# Patient Record
Sex: FEMALE | Race: WHITE | NOT HISPANIC OR LATINO
[De-identification: names, ages, dates, MRNs, and addresses within clinical notes are randomized per-mention and may not be internally consistent; named-entity substitution may affect disease eponyms.]

---

## 2018-07-31 PROBLEM — Z00.00 ENCOUNTER FOR PREVENTIVE HEALTH EXAMINATION: Status: ACTIVE | Noted: 2018-07-31

## 2018-10-09 ENCOUNTER — RX RENEWAL (OUTPATIENT)
Age: 68
End: 2018-10-09

## 2018-11-08 ENCOUNTER — RECORD ABSTRACTING (OUTPATIENT)
Age: 68
End: 2018-11-08

## 2018-11-08 DIAGNOSIS — Z83.49 FAMILY HISTORY OF OTHER ENDOCRINE, NUTRITIONAL AND METABOLIC DISEASES: ICD-10-CM

## 2018-11-08 DIAGNOSIS — Z87.19 PERSONAL HISTORY OF OTHER DISEASES OF THE DIGESTIVE SYSTEM: ICD-10-CM

## 2018-11-08 DIAGNOSIS — Z83.3 FAMILY HISTORY OF DIABETES MELLITUS: ICD-10-CM

## 2018-11-08 DIAGNOSIS — Z82.49 FAMILY HISTORY OF ISCHEMIC HEART DISEASE AND OTHER DISEASES OF THE CIRCULATORY SYSTEM: ICD-10-CM

## 2018-11-08 DIAGNOSIS — Z78.9 OTHER SPECIFIED HEALTH STATUS: ICD-10-CM

## 2018-11-08 DIAGNOSIS — Z80.9 FAMILY HISTORY OF MALIGNANT NEOPLASM, UNSPECIFIED: ICD-10-CM

## 2018-11-08 DIAGNOSIS — Z86.39 PERSONAL HISTORY OF OTHER ENDOCRINE, NUTRITIONAL AND METABOLIC DISEASE: ICD-10-CM

## 2018-11-08 DIAGNOSIS — Z87.39 PERSONAL HISTORY OF OTHER DISEASES OF THE MUSCULOSKELETAL SYSTEM AND CONNECTIVE TISSUE: ICD-10-CM

## 2018-11-09 ENCOUNTER — APPOINTMENT (OUTPATIENT)
Dept: ENDOCRINOLOGY | Facility: CLINIC | Age: 68
End: 2018-11-09
Payer: COMMERCIAL

## 2018-11-09 VITALS
WEIGHT: 192 LBS | BODY MASS INDEX: 34.02 KG/M2 | DIASTOLIC BLOOD PRESSURE: 60 MMHG | HEART RATE: 80 BPM | HEIGHT: 63 IN | SYSTOLIC BLOOD PRESSURE: 110 MMHG

## 2018-11-09 LAB — GLUCOSE BLDC GLUCOMTR-MCNC: 113

## 2018-11-09 PROCEDURE — 99214 OFFICE O/P EST MOD 30 MIN: CPT | Mod: 25

## 2018-11-09 PROCEDURE — 82962 GLUCOSE BLOOD TEST: CPT

## 2018-11-18 ENCOUNTER — RX RENEWAL (OUTPATIENT)
Age: 68
End: 2018-11-18

## 2019-02-25 ENCOUNTER — OTHER (OUTPATIENT)
Age: 69
End: 2019-02-25

## 2019-03-07 ENCOUNTER — APPOINTMENT (OUTPATIENT)
Dept: ENDOCRINOLOGY | Facility: CLINIC | Age: 69
End: 2019-03-07
Payer: COMMERCIAL

## 2019-03-07 VITALS
WEIGHT: 192 LBS | HEIGHT: 63 IN | SYSTOLIC BLOOD PRESSURE: 124 MMHG | BODY MASS INDEX: 34.02 KG/M2 | HEART RATE: 71 BPM | DIASTOLIC BLOOD PRESSURE: 58 MMHG

## 2019-03-07 LAB — GLUCOSE BLDC GLUCOMTR-MCNC: 132

## 2019-03-07 PROCEDURE — 99214 OFFICE O/P EST MOD 30 MIN: CPT | Mod: 25

## 2019-03-07 PROCEDURE — 82962 GLUCOSE BLOOD TEST: CPT

## 2019-03-07 RX ORDER — OMEPRAZOLE 40 MG/1
40 CAPSULE, DELAYED RELEASE ORAL
Refills: 0 | Status: ACTIVE | COMMUNITY

## 2019-03-07 RX ORDER — FENOFIBRIC ACID 135 MG/1
135 CAPSULE, DELAYED RELEASE ORAL
Refills: 0 | Status: ACTIVE | COMMUNITY

## 2019-03-07 RX ORDER — EZETIMIBE 10 MG/1
10 TABLET ORAL
Refills: 0 | Status: ACTIVE | COMMUNITY

## 2019-03-07 RX ORDER — BLOOD SUGAR DIAGNOSTIC
STRIP MISCELLANEOUS
Qty: 1 | Refills: 3 | Status: ACTIVE | COMMUNITY
Start: 2019-03-07 | End: 1900-01-01

## 2019-03-07 NOTE — ASSESSMENT
[FreeTextEntry1] : DM type 2, good control without hypoglycemia\par hypothyroid, euthyroid on replacement\par hyperlipidemia stable

## 2019-03-07 NOTE — HISTORY OF PRESENT ILLNESS
[FreeTextEntry1] : Quality:  Type 2.   \par Severity:  Moderate\par Duration:  15 years\par Onset:  Presented with blurry vision\par Associated Symptoms:   none\par Notes:  Current regimen:\par 	metformin 500 two bid\par 	Novolog	flexible dosing, up to 50 units a day. 10-15 units recently\par 	tresiba	84\par 	victoza	1.8 - discontinuing victoza as not affordable\par carries glucose tabs\par \par Weight History:  \par weight watchers\par \par Blood Glucose Testing Information \par using dorita\par \par \par Past Medical History\par Arthritis. Hypertension. Thyroid Problems. Diabetes. Reflux/Ulcers. Cholesterol. \par Notes:  hypothyroid, on T4 100 and T3 12.5 ug\par renal cysts\par

## 2019-03-07 NOTE — PHYSICAL EXAM
[Thyroid Not Enlarged] : the thyroid was not enlarged [Clear to Auscultation] : lungs were clear to auscultation bilaterally

## 2019-04-24 ENCOUNTER — RX RENEWAL (OUTPATIENT)
Age: 69
End: 2019-04-24

## 2019-05-04 ENCOUNTER — RX RENEWAL (OUTPATIENT)
Age: 69
End: 2019-05-04

## 2019-07-29 LAB
HBA1C MFR BLD HPLC: 6.4
LDLC SERPL CALC-MCNC: 64

## 2019-08-01 ENCOUNTER — APPOINTMENT (OUTPATIENT)
Dept: ENDOCRINOLOGY | Facility: CLINIC | Age: 69
End: 2019-08-01
Payer: COMMERCIAL

## 2019-08-01 ENCOUNTER — MEDICATION RENEWAL (OUTPATIENT)
Age: 69
End: 2019-08-01

## 2019-08-01 VITALS
BODY MASS INDEX: 33.13 KG/M2 | HEART RATE: 74 BPM | WEIGHT: 187 LBS | SYSTOLIC BLOOD PRESSURE: 120 MMHG | DIASTOLIC BLOOD PRESSURE: 70 MMHG | HEIGHT: 63 IN

## 2019-08-01 LAB — GLUCOSE BLDC GLUCOMTR-MCNC: 158

## 2019-08-01 PROCEDURE — 82962 GLUCOSE BLOOD TEST: CPT

## 2019-08-01 PROCEDURE — 99214 OFFICE O/P EST MOD 30 MIN: CPT | Mod: 25

## 2019-08-01 RX ORDER — IRBESARTAN AND HYDROCHLOROTHIAZIDE 150; 12.5 MG/1; MG/1
150-12.5 TABLET, FILM COATED ORAL
Refills: 0 | Status: DISCONTINUED | COMMUNITY
End: 2019-08-01

## 2019-08-01 NOTE — ASSESSMENT
[FreeTextEntry1] : DM type 2, good control \par hypothyroid, euthyroid on replacement\par hyperlipidemia stable

## 2019-08-01 NOTE — HISTORY OF PRESENT ILLNESS
[FreeTextEntry1] : Quality:  Type 2.   \par Severity:  Moderate\par Duration:  15 years\par Onset:  Presented with blurry vision\par Associated Symptoms:   none\par Notes:  Current regimen:\par 	metformin 500 two bid\par 	Novolog	flexible dosing, up to 50 units a day. 10-15 units recently. Injecting four times a day\par 	tresiba	60\par 	victoza	1.8 - discontinuing victoza as not affordable\par carries glucose tabs\par \par Weight History:  \par weight watchers\par \par Blood Glucose Testing Information \par using dorita. ? no longer covered by insurance. According to pt. dexcom covered\par \par \par Past Medical History\par Arthritis. Hypertension. Thyroid Problems. Diabetes. Reflux/Ulcers. Cholesterol. \par Notes:  hypothyroid, on T4 100 and T3 12.5 ug\par renal cysts\par

## 2019-08-28 ENCOUNTER — RX RENEWAL (OUTPATIENT)
Age: 69
End: 2019-08-28

## 2019-11-22 ENCOUNTER — RX RENEWAL (OUTPATIENT)
Age: 69
End: 2019-11-22

## 2020-01-09 ENCOUNTER — APPOINTMENT (OUTPATIENT)
Dept: ENDOCRINOLOGY | Facility: CLINIC | Age: 70
End: 2020-01-09
Payer: COMMERCIAL

## 2020-01-09 VITALS
HEIGHT: 63 IN | OXYGEN SATURATION: 98 % | SYSTOLIC BLOOD PRESSURE: 130 MMHG | WEIGHT: 185.31 LBS | DIASTOLIC BLOOD PRESSURE: 80 MMHG | BODY MASS INDEX: 32.84 KG/M2 | HEART RATE: 68 BPM

## 2020-01-09 LAB
GLUCOSE BLDC GLUCOMTR-MCNC: 94
LDLC SERPL DIRECT ASSAY-MCNC: 91
MICROALBUMIN/CREAT 24H UR-RTO: 11

## 2020-01-09 PROCEDURE — 99214 OFFICE O/P EST MOD 30 MIN: CPT | Mod: 25

## 2020-01-09 PROCEDURE — 82962 GLUCOSE BLOOD TEST: CPT

## 2020-01-09 RX ORDER — VALSARTAN AND HYDROCHLOROTHIAZIDE 160; 25 MG/1; MG/1
160-25 TABLET, FILM COATED ORAL
Refills: 0 | Status: ACTIVE | COMMUNITY

## 2020-01-09 RX ORDER — FENOFIBRATE 134 MG/1
134 CAPSULE ORAL
Refills: 0 | Status: DISCONTINUED | COMMUNITY
End: 2020-01-09

## 2020-01-09 RX ORDER — CYPROHEPTADINE HYDROCHLORIDE 4 MG/1
4 TABLET ORAL
Refills: 0 | Status: DISCONTINUED | COMMUNITY
End: 2020-01-09

## 2020-01-09 NOTE — HISTORY OF PRESENT ILLNESS
[FreeTextEntry1] : Quality:  Type 2.   \par Severity:  Moderate\par Duration:  16 years\par Onset:  Presented with blurry vision\par Associated Symptoms:   none\par Notes:  Current regimen:\par 	metformin 500 two bid\par 	Novolog	flexible dosing, up to 50 units a day. 10-15 units recently. Injecting four times a day\par 	tresiba	40-50\par 	victoza	1.8 - discontinuing victoza as not affordable\par carries glucose tabs\par \par Weight History:  \par weight watchers\par \par Blood Glucose Testing Information \par using dorita. ? no longer covered by insurance. According to pt. dexcom covered\par \par \par Past Medical History\par Arthritis. Hypertension. Thyroid Problems. Diabetes. Reflux/Ulcers. Cholesterol. \par Notes:  hypothyroid, on T4 100 and T3 12.5 ug\par renal cysts\par

## 2020-01-09 NOTE — ASSESSMENT
[FreeTextEntry1] : DM type 2, good control . TO try to lock in to a stable tresiba dose\par hypothyroid, euthyroid on replacement\par hyperlipidemia with persistent triglyceride elevation; continue to observe.

## 2020-05-01 ENCOUNTER — APPOINTMENT (OUTPATIENT)
Dept: ENDOCRINOLOGY | Facility: CLINIC | Age: 70
End: 2020-05-01
Payer: COMMERCIAL

## 2020-05-01 PROCEDURE — 99214 OFFICE O/P EST MOD 30 MIN: CPT | Mod: 95

## 2020-05-01 NOTE — PHYSICAL EXAM
[Alert] : alert [No Acute Distress] : no acute distress [Oriented x3] : oriented to person, place, and time [Normal Insight/Judgement] : insight and judgment were intact [de-identified] : no periorbital edema [de-identified] : voice WNL

## 2020-05-01 NOTE — ASSESSMENT
[FreeTextEntry1] : DM type 2, good control . Try to review dorita downloads\par hypothyroid, euthyroid on replacement\par hyperlipidemia with persistent triglyceride elevation; improving

## 2020-05-01 NOTE — HISTORY OF PRESENT ILLNESS
[Medical Office: ([facility])___] : at the medical office located in  [Home] : at home, [unfilled] , at the time of the visit. [Patient] : the patient [FreeTextEntry1] : Quality:  Type 2.   \par Severity:  Moderate\par Duration:  16 years\par Onset:  Presented with blurry vision\par Associated Symptoms:   none\par Notes:  Current regimen:\par 	metformin 500 two bid\par 	Novolog	flexible dosing, up to 50 units a day. 10-15 units recently. Injecting four times a day\par 	tresiba	44\par 	victoza	1.8 - discontinuing victoza as not affordable\par carries glucose tabs\par \par Weight History:  \par weight watchers\par \par Blood Glucose Testing Information \par using dorita\par \par Past Medical History\par Arthritis. Hypertension. Thyroid Problems. Diabetes. Reflux/Ulcers. Cholesterol. \par Notes:  hypothyroid, on T4 100 and T3 12.5 ug\par renal cysts\par

## 2020-09-28 ENCOUNTER — APPOINTMENT (OUTPATIENT)
Dept: ENDOCRINOLOGY | Facility: CLINIC | Age: 70
End: 2020-09-28

## 2020-09-29 ENCOUNTER — APPOINTMENT (OUTPATIENT)
Dept: ENDOCRINOLOGY | Facility: CLINIC | Age: 70
End: 2020-09-29
Payer: COMMERCIAL

## 2020-09-29 ENCOUNTER — RESULT CHARGE (OUTPATIENT)
Age: 70
End: 2020-09-29

## 2020-09-29 VITALS
HEART RATE: 62 BPM | DIASTOLIC BLOOD PRESSURE: 60 MMHG | HEIGHT: 63 IN | BODY MASS INDEX: 30.48 KG/M2 | SYSTOLIC BLOOD PRESSURE: 122 MMHG | WEIGHT: 172 LBS

## 2020-09-29 LAB
GLUCOSE BLDC GLUCOMTR-MCNC: 146
HBA1C MFR BLD HPLC: 6.5
HBA1C MFR BLD HPLC: 6.6
LDLC SERPL CALC-MCNC: 65
MICROALBUMIN/CREAT 24H UR-RTO: NORMAL

## 2020-09-29 PROCEDURE — 95251 CONT GLUC MNTR ANALYSIS I&R: CPT

## 2020-09-29 PROCEDURE — 99214 OFFICE O/P EST MOD 30 MIN: CPT | Mod: 25

## 2020-09-29 RX ORDER — TOPIRAMATE 100 MG/1
100 TABLET, FILM COATED ORAL
Refills: 0 | Status: DISCONTINUED | COMMUNITY
End: 2020-09-29

## 2020-09-29 NOTE — ASSESSMENT
[FreeTextEntry1] : 69 year old female with T2DM, hypothyroidism, and hyperlipidemia. Glycemic control is excellent.\par \par 1. T2DM- excellent control with A1c 6.5%.\par -Decrease Novolog at dinner by two units.\par -Continue Tresiba and Metformin.\par -Continue SMBG with freestyle Christian.\par -Glucose sensor necessity: This patient with diabetes performs four or more glucose checks per day utilizing a home blood glucose monitor. The patient is treated with insulin via three or more injections daily (or a subcutaneous insulin infusion pump). This patient requires frequent adjustments to their insulin treatment on the basis of therapeutic continuous glucose monitoring results and according to sliding scale. In addition, the patient has been to our office for an evaluation of their diabetes control within the past six months. \par -Repeat A1c in 3 months.\par \par 2. Hypothyroid- euthyroid on replacement T4 and T3.\par -Continue levothyroxine and liothyronine.\par -Repeat TFTs with next labs in 3 months.\par \par 3. Hyperlipidemia- LDL at target,, triglycerides improving.\par -Continue statin, Zetia, and Trilipix.

## 2020-09-29 NOTE — PHYSICAL EXAM
[Alert] : alert [Well Nourished] : well nourished [No Acute Distress] : no acute distress [Well Developed] : well developed [Normal Sclera/Conjunctiva] : normal sclera/conjunctiva [EOMI] : extra ocular movement intact [No Proptosis] : no proptosis [Thyroid Not Enlarged] : the thyroid was not enlarged [No Thyroid Nodules] : no palpable thyroid nodules [No Respiratory Distress] : no respiratory distress [No Accessory Muscle Use] : no accessory muscle use [Clear to Auscultation] : lungs were clear to auscultation bilaterally [Normal S1, S2] : normal S1 and S2 [Normal Rate] : heart rate was normal [Regular Rhythm] : with a regular rhythm [No Edema] : no peripheral edema [Normal Anterior Cervical Nodes] : no anterior cervical lymphadenopathy [Normal Posterior Cervical Nodes] : no posterior cervical lymphadenopathy [No Tremors] : no tremors [Oriented x3] : oriented to person, place, and time [Normal Affect] : the affect was normal [Normal Mood] : the mood was normal [Acanthosis Nigricans] : no acanthosis nigricans

## 2020-09-29 NOTE — REVIEW OF SYSTEMS
[Recent Weight Loss (___ Lbs)] : recent weight loss: [unfilled] lbs [Chest Pain] : chest pain [Blurred Vision] : no blurred vision [Palpitations] : no palpitations [Shortness Of Breath] : no shortness of breath [Nausea] : no nausea [Abdominal Pain] : no abdominal pain [Vomiting] : no vomiting [Polyuria] : no polyuria [Tremors] : no tremors [Depression] : no depression [Anxiety] : no anxiety [Polydipsia] : no polydipsia [Cold Intolerance] : no cold intolerance [Heat Intolerance] : no heat intolerance [FreeTextEntry5] : sees cardiology Yes

## 2020-09-29 NOTE — HISTORY OF PRESENT ILLNESS
[FreeTextEntry1] : Quality: Type 2. \par Severity: Moderate\par Duration: 16 years\par Onset: Presented with blurry vision\par Associated Symptoms: none\par Notes: Current regimen:\par 	Metformin 500 mg, two tabs BID\par 	Novolog	flexible dosing, up to 50 units a day, usually around 12 units\par 	Tresiba	40 units\par On Victoza in the past, d/c'ed due to insurance \par carries glucose tabs\par \par Weight:lost 10 pounds over the past 9 months\par Exercise: biking more\par Diet: no changes, two waffles and 4 sausages for breakfast, big salad for lunch, small dinner (small porition of meat with vegetable, occasionally has rice/potato) \par \par Eye exam: 2020, no DR per patient\par Foot exam: q 10 weeks, denies pain/numbness/tingling in B/L feet\par Kidney disease: sees nephro for kidney cysts\par Heart disease: none\par \par Blood Glucose Testing Information \par using dorita\par occasional hypoglycemia- usually when not eating a lot of carbs, mostly at night.\par Current B. ate breakfast two hours\par Reviewed CGMS. Avg , %CV 27% with 94% in range, 4% high, and 2% low. BG trends low after dinner.\par \par Past Medical History\par Arthritis. Hypertension. Thyroid Problems. Diabetes. Reflux/Ulcers. Cholesterol. \par Notes: hypothyroid, on T4 100 and T3 12.5 ug\par renal cysts\par \par Hypothyroid\par LT4 100 mcg dialy- takes with all other medications 1 hour before breakfast\par Cytomel 12.5 mg daily- takes before lunch every day.\par

## 2020-12-28 LAB
HBA1C MFR BLD HPLC: 6.9
LDLC SERPL DIRECT ASSAY-MCNC: 76
MICROALBUMIN/CREAT 24H UR-RTO: 3

## 2020-12-29 ENCOUNTER — APPOINTMENT (OUTPATIENT)
Dept: ENDOCRINOLOGY | Facility: CLINIC | Age: 70
End: 2020-12-29
Payer: COMMERCIAL

## 2020-12-29 PROCEDURE — 99214 OFFICE O/P EST MOD 30 MIN: CPT | Mod: 25,95

## 2020-12-29 RX ORDER — ASPIRIN 81 MG
81 TABLET, DELAYED RELEASE (ENTERIC COATED) ORAL
Refills: 0 | Status: ACTIVE | COMMUNITY

## 2020-12-29 NOTE — HISTORY OF PRESENT ILLNESS
[FreeTextEntry1] : This visit was provided via telehealth using real-time 2-way audio visual technology. The patient, DAVID LAMBERT , was located at home, [address] [place] [address] , at the time of the visit. \par The provider, LARA VOGT, was located at the medical office located in [place], [place] at the time of the visit. The patient, DAVID LAMBERT and Provider participated in the telehealth encounter. \par Verbal consent given on 12/29/2020 by the patient. \par \par Telehealth visit performed due to COVID-19 Pandemic.\par Time started: 9:28 AM\par Time Ended: 9:39 AM\par \par Today with c/o leg pain since November, initially improved with CoQ 10 but now worsening. Started Zetia at the end of August and attributes leg pain to new medication. Discussed with cardiology and has follow-up with PCP about symptoms today.\par \par Quality: Type 2. \par Severity: Moderate\par Duration: 16 years\par Onset: Presented with blurry vision\par Associated Symptoms: none\par Notes: Current regimen:\par 	Metformin 500 mg, two tabs BID\par 	Novolog	flexible dosing, up to 50 units a day, usually around 12 units\par 	Tresiba	40 units\par On Victoza in the past, d/c'ed due to insurance \par carries glucose tabs\par \par Weight:lost 4 pounds\par Exercise: biking 5 mins every hour daily\par Diet: no changes, two waffles and 4 sausages for breakfast, big salad for lunch, small dinner (small porition of meat with vegetable, occasionally has rice/potato) \par \par Eye exam: January 2020, no DR per patient\par Foot exam: q 10 weeks, denies pain/numbness/tingling in B/L feet\par Kidney disease: sees nephro for kidney cysts\par Heart disease: none\par \par Blood Glucose Testing Information \par using dorita\par occasional hypoglycemia- usually when not eating a lot of carbs, mostly at night.\par Reviewed CGMS. Avg , %CV 26.4% with 89% in range, 9% high, and 2% low. BG trends low in the afternoon and evening, prior to dinner.\par \par Past Medical History\par Arthritis. Hypertension. Thyroid Problems. Diabetes. Reflux/Ulcers. Cholesterol. \par renal cysts\par \par Hypothyroid\par LT4 100 mcg daily- takes with all other medications 1 hour before breakfast\par Cytomel 12.5 mg daily- takes before lunch every day.\par

## 2020-12-29 NOTE — REVIEW OF SYSTEMS
[Recent Weight Loss (___ Lbs)] : recent weight loss: [unfilled] lbs [Myalgia] : myalgia  [Muscle Cramps] : muscle cramps [Recent Weight Gain (___ Lbs)] : no recent weight gain [Dysphagia] : no dysphagia [Neck Pain] : no neck pain [Dysphonia] : no dysphonia [Chest Pain] : no chest pain [Palpitations] : no palpitations [Shortness Of Breath] : no shortness of breath [Nausea] : no nausea [Constipation] : no constipation [Abdominal Pain] : no abdominal pain [Vomiting] : no vomiting [Diarrhea] : no diarrhea [Polyuria] : no polyuria [Tremors] : no tremors [Pain/Numbness of Digits] : no pain/numbness of digits [Depression] : no depression [Anxiety] : no anxiety [Polydipsia] : no polydipsia

## 2020-12-29 NOTE — ASSESSMENT
[FreeTextEntry1] : 69 year old female with T2DM, hypothyroidism, and hyperlipidemia. Glycemic control is at goal but she is having low blood sugar between lunch and dinner.\par \par 1. T2DM- excellent control with A1c 6.9%.\par -Decrease Novolog at lunch by two units\par -Continue Tresiba and Metformin. If lows continue, can decrease Tresiba.\par -Continue SMBG with freestyle Christian.\par -Glucose sensor necessity: This patient with diabetes performs four or more glucose checks per day utilizing a home blood glucose monitor. The patient is treated with insulin via three or more injections daily (or a subcutaneous insulin infusion pump). This patient requires frequent adjustments to their insulin treatment on the basis of therapeutic continuous glucose monitoring results and according to sliding scale. In addition, the patient has been to our office for an evaluation of their diabetes control within the past six months. \par -Repeat A1c in 3 months.\par \par 2. Hypothyroid- euthyroid on replacement T4 and T3.\par -Continue levothyroxine and liothyronine.\par -Repeat TFTs with next labs in 3 months.\par \par 3. Hyperlipidemia- LDL at target, triglycerides improving.\par -Continue statin, Zetia, and Trilipix. \par -Will discuss leg pain with PCP today.\par

## 2021-03-12 ENCOUNTER — TRANSCRIPTION ENCOUNTER (OUTPATIENT)
Age: 71
End: 2021-03-12

## 2021-03-26 LAB
HBA1C MFR BLD HPLC: 7.2
LDLC SERPL DIRECT ASSAY-MCNC: 88
MICROALBUMIN/CREAT 24H UR-RTO: 4

## 2021-03-29 ENCOUNTER — APPOINTMENT (OUTPATIENT)
Dept: ENDOCRINOLOGY | Facility: CLINIC | Age: 71
End: 2021-03-29
Payer: COMMERCIAL

## 2021-03-29 VITALS
BODY MASS INDEX: 31.54 KG/M2 | DIASTOLIC BLOOD PRESSURE: 80 MMHG | HEIGHT: 63 IN | SYSTOLIC BLOOD PRESSURE: 120 MMHG | OXYGEN SATURATION: 98 % | HEART RATE: 82 BPM | WEIGHT: 178 LBS

## 2021-03-29 LAB — GLUCOSE BLDC GLUCOMTR-MCNC: 189

## 2021-03-29 PROCEDURE — 99214 OFFICE O/P EST MOD 30 MIN: CPT | Mod: 25

## 2021-03-29 PROCEDURE — 82962 GLUCOSE BLOOD TEST: CPT

## 2021-03-29 PROCEDURE — 99072 ADDL SUPL MATRL&STAF TM PHE: CPT

## 2021-03-29 NOTE — HISTORY OF PRESENT ILLNESS
[FreeTextEntry1] : \par \par Quality: Type 2. \par Severity: Moderate\par Duration: 17 years\par Onset: Presented with blurry vision\par Associated Symptoms: none\par Notes: Current regimen:\par 	Metformin 500 mg, two tabs BID\par 	Novolog	flexible dosing, up to 50 units a day, usually around 12 units\par 	Tresiba	40 units\par On Victoza in the past, d/c'ed due to insurance \par carries glucose tabs\par \par Weight:lost 4 pounds\par Exercise: biking 5 mins every hour daily\par Diet: no changes, two waffles and 4 sausages for breakfast, big salad for lunch, small dinner (small porition of meat with vegetable, occasionally has rice/potato) \par \par Eye exam: January 2020, no DR per patient\par Foot exam: q 10 weeks, denies pain/numbness/tingling in B/L feet. Due to back; received injections and also oral steroids\par Kidney disease: sees nephro for kidney cysts\par Heart disease: none\par \par Blood Glucose Testing Information \par using dorita\par occasional hypoglycemia- usually when not eating a lot of carbs, mostly at night.\par \par \par Past Medical History\par Arthritis. Hypertension. Thyroid Problems. Diabetes. Reflux/Ulcers. Cholesterol. \par renal cysts\par \par Hypothyroid\par LT4 100 mcg daily- takes with all other medications 1 hour before breakfast\par Cytomel 12.5 mg daily- takes before lunch every day.\par

## 2021-03-29 NOTE — ASSESSMENT
[FreeTextEntry1] : DM type 2, mild loss of control due to steroid rx\par hypothyroid, euthyroid on replacement\par mild rise in triglycerides can improve with better glycemic control

## 2021-06-10 ENCOUNTER — RX RENEWAL (OUTPATIENT)
Age: 71
End: 2021-06-10

## 2021-06-14 ENCOUNTER — TRANSCRIPTION ENCOUNTER (OUTPATIENT)
Age: 71
End: 2021-06-14

## 2021-06-16 ENCOUNTER — TRANSCRIPTION ENCOUNTER (OUTPATIENT)
Age: 71
End: 2021-06-16

## 2021-07-15 DIAGNOSIS — R79.89 OTHER SPECIFIED ABNORMAL FINDINGS OF BLOOD CHEMISTRY: ICD-10-CM

## 2021-07-19 PROBLEM — R79.89 ABNORMAL CBC: Status: ACTIVE | Noted: 2021-07-19

## 2021-07-23 LAB
HBA1C MFR BLD HPLC: 7.3
LDLC SERPL DIRECT ASSAY-MCNC: 78

## 2021-07-26 ENCOUNTER — APPOINTMENT (OUTPATIENT)
Dept: ENDOCRINOLOGY | Facility: CLINIC | Age: 71
End: 2021-07-26
Payer: COMMERCIAL

## 2021-07-26 VITALS
WEIGHT: 175 LBS | DIASTOLIC BLOOD PRESSURE: 84 MMHG | HEART RATE: 77 BPM | SYSTOLIC BLOOD PRESSURE: 132 MMHG | BODY MASS INDEX: 31.01 KG/M2 | HEIGHT: 63 IN

## 2021-07-26 LAB — GLUCOSE BLDC GLUCOMTR-MCNC: 134

## 2021-07-26 PROCEDURE — 99214 OFFICE O/P EST MOD 30 MIN: CPT | Mod: 25

## 2021-07-26 PROCEDURE — 99072 ADDL SUPL MATRL&STAF TM PHE: CPT

## 2021-07-26 PROCEDURE — 82962 GLUCOSE BLOOD TEST: CPT

## 2021-07-26 PROCEDURE — 95251 CONT GLUC MNTR ANALYSIS I&R: CPT

## 2021-07-26 NOTE — HISTORY OF PRESENT ILLNESS
[FreeTextEntry1] : \par \par Quality: Type 2. \par Severity: Moderate\par Duration: 17 years\par Onset: Presented with blurry vision\par Associated Symptoms: none\par Notes: Current regimen:\par 	Metformin 500 mg, two tabs BID\par 	Novolog	flexible dosing, up to 50 units a day, usually around 12 units\par 	Tresiba	36 units\par On Victoza in the past, d/c'ed due to insurance \par carries glucose tabs\par \par Weight:lost 4 pounds\par Exercise: biking 5 mins every hour daily\par Diet: no changes, two waffles and 4 sausages for breakfast, big salad for lunch, small dinner (small porition of meat with vegetable, occasionally has rice/potato) \par \par Eye exam: January 2020, no DR per patient\par Foot exam: q 10 weeks, denies pain/numbness/tingling in B/L feet. Due to back; received injections and also oral steroids\par Kidney disease: sees nephro for kidney cysts\par Heart disease: none\par \par Blood Glucose Testing Information \par using dorita\par occasional hypoglycemia- usually when not eating a lot of carbs, mostly at night.\par \par \par Past Medical History\par Arthritis. Hypertension. Thyroid Problems. Diabetes. Reflux/Ulcers. Cholesterol. \par renal cysts\par \par Hypothyroid\par LT4 100 mcg daily- takes with all other medications 1 hour before breakfast\par Cytomel 12.5 mg daily- takes before lunch every day.\par \par s/p spine surgery. Labs afterwards revealed immature rbc forms; repeat appears to be better\par  [Continuous Glucose Monitoring] : Continuous Glucose Monitoring: Yes [Christian] : Christian [FreeTextEntry2] : 87 [FreeTextEntry3] : 2 [FreeTextEntry4] : 11 [de-identified] : 5.7 [FreeTextEntry5] : 92007.5

## 2021-07-26 NOTE — ASSESSMENT
[FreeTextEntry1] : DM type 2, excellent control. DIscussed reduction in insulin\par hypothyroid, euthyroid on replacement\par mild rise in triglycerides can improve with better glycemic control

## 2021-08-28 ENCOUNTER — RX RENEWAL (OUTPATIENT)
Age: 71
End: 2021-08-28

## 2021-08-29 ENCOUNTER — TRANSCRIPTION ENCOUNTER (OUTPATIENT)
Age: 71
End: 2021-08-29

## 2021-09-17 ENCOUNTER — TRANSCRIPTION ENCOUNTER (OUTPATIENT)
Age: 71
End: 2021-09-17

## 2021-11-29 ENCOUNTER — APPOINTMENT (OUTPATIENT)
Dept: ENDOCRINOLOGY | Facility: CLINIC | Age: 71
End: 2021-11-29

## 2021-12-11 ENCOUNTER — RX RENEWAL (OUTPATIENT)
Age: 71
End: 2021-12-11

## 2021-12-20 LAB
HBA1C MFR BLD HPLC: 7.1
LDLC SERPL DIRECT ASSAY-MCNC: 54
MICROALBUMIN/CREAT 24H UR-RTO: 23

## 2021-12-21 ENCOUNTER — APPOINTMENT (OUTPATIENT)
Dept: ENDOCRINOLOGY | Facility: CLINIC | Age: 71
End: 2021-12-21
Payer: COMMERCIAL

## 2021-12-21 VITALS
OXYGEN SATURATION: 98 % | WEIGHT: 180 LBS | SYSTOLIC BLOOD PRESSURE: 130 MMHG | BODY MASS INDEX: 31.89 KG/M2 | HEIGHT: 63 IN | DIASTOLIC BLOOD PRESSURE: 64 MMHG | HEART RATE: 67 BPM

## 2021-12-21 LAB — GLUCOSE BLDC GLUCOMTR-MCNC: 120

## 2021-12-21 PROCEDURE — 99214 OFFICE O/P EST MOD 30 MIN: CPT | Mod: 25

## 2021-12-21 PROCEDURE — 95251 CONT GLUC MNTR ANALYSIS I&R: CPT

## 2021-12-21 PROCEDURE — 82962 GLUCOSE BLOOD TEST: CPT

## 2021-12-21 NOTE — ASSESSMENT
[FreeTextEntry1] : DM type 2, having some low blood sugars during the overnight, decrease Tresiba to 30 units at bedtime, if low blood sugars continues to occur advised pt. to call office for another Christian download. Continue Christian sensor. Repeat A1C before next visit. \par \par Hypothyroid, euthyroid on replacement. Repeat TFTs before next visit.\par \par Hyperlipidemia controlled with statin. Repeat lipid panel before next visit.\par \par RTO in 3-4 months with Dr. Garnica

## 2021-12-21 NOTE — REVIEW OF SYSTEMS
[Neck Pain] : neck pain [Constipation] : constipation [Headaches] : headaches [Fatigue] : no fatigue [Decreased Appetite] : appetite not decreased [Recent Weight Gain (___ Lbs)] : no recent weight gain [Recent Weight Loss (___ Lbs)] : no recent weight loss [Visual Field Defect] : no visual field defect [Blurred Vision] : no blurred vision [Dysphagia] : no dysphagia [Dysphonia] : no dysphonia [Chest Pain] : no chest pain [Palpitations] : no palpitations [Diarrhea] : no diarrhea [Polyuria] : no polyuria [Dysuria] : no dysuria [Tremors] : no tremors [Depression] : no depression [Anxiety] : no anxiety [Polydipsia] : no polydipsia [FreeTextEntry2] : stable  [FreeTextEntry4] : chronic posterior neck pain  [FreeTextEntry7] : slightly due pain medication  [de-identified] : hx of headaches, sees Neurologist

## 2021-12-21 NOTE — PHYSICAL EXAM
[Alert] : alert [Well Nourished] : well nourished [No Acute Distress] : no acute distress [Well Developed] : well developed [Normal Sclera/Conjunctiva] : normal sclera/conjunctiva [No Proptosis] : no proptosis [No LAD] : no lymphadenopathy [Thyroid Not Enlarged] : the thyroid was not enlarged [No Thyroid Nodules] : no palpable thyroid nodules [No Respiratory Distress] : no respiratory distress [No Accessory Muscle Use] : no accessory muscle use [Normal Rate and Effort] : normal respiratory rate and effort [Clear to Auscultation] : lungs were clear to auscultation bilaterally [Normal S1, S2] : normal S1 and S2 [Normal Rate] : heart rate was normal [Regular Rhythm] : with a regular rhythm [Normal Bowel Sounds] : normal bowel sounds [Not Tender] : non-tender [Soft] : abdomen soft [Normal Gait] : normal gait [No Rash] : no rash [Acanthosis Nigricans] : no acanthosis nigricans [No Tremors] : no tremors [Oriented x3] : oriented to person, place, and time [Normal Affect] : the affect was normal [Normal Insight/Judgement] : insight and judgment were intact [Normal Mood] : the mood was normal
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
## 2022-02-11 ENCOUNTER — TRANSCRIPTION ENCOUNTER (OUTPATIENT)
Age: 72
End: 2022-02-11

## 2022-03-24 LAB
HBA1C MFR BLD HPLC: 7.1
LDLC SERPL CALC-MCNC: 40
MICROALBUMIN/CREAT 24H UR-RTO: 3

## 2022-03-30 ENCOUNTER — APPOINTMENT (OUTPATIENT)
Dept: ENDOCRINOLOGY | Facility: CLINIC | Age: 72
End: 2022-03-30
Payer: COMMERCIAL

## 2022-03-30 VITALS
WEIGHT: 180 LBS | BODY MASS INDEX: 31.89 KG/M2 | HEIGHT: 63 IN | SYSTOLIC BLOOD PRESSURE: 124 MMHG | OXYGEN SATURATION: 97 % | HEART RATE: 72 BPM | DIASTOLIC BLOOD PRESSURE: 68 MMHG

## 2022-03-30 LAB — GLUCOSE BLDC GLUCOMTR-MCNC: 138

## 2022-03-30 PROCEDURE — 82962 GLUCOSE BLOOD TEST: CPT

## 2022-03-30 PROCEDURE — 99214 OFFICE O/P EST MOD 30 MIN: CPT | Mod: 25

## 2022-03-30 PROCEDURE — 95251 CONT GLUC MNTR ANALYSIS I&R: CPT

## 2022-03-30 RX ORDER — ERENUMAB-AOOE 70 MG/ML
70 INJECTION SUBCUTANEOUS
Refills: 0 | Status: DISCONTINUED | COMMUNITY
End: 2022-03-30

## 2022-03-30 RX ORDER — ATORVASTATIN CALCIUM 80 MG/1
80 TABLET, FILM COATED ORAL
Refills: 0 | Status: DISCONTINUED | COMMUNITY
End: 2022-03-30

## 2022-03-30 RX ORDER — FREMANEZUMAB-VFRM 225 MG/1.5ML
225 INJECTION SUBCUTANEOUS
Refills: 0 | Status: ACTIVE | COMMUNITY

## 2022-03-30 NOTE — HISTORY OF PRESENT ILLNESS
[Continuous Glucose Monitoring] : Continuous Glucose Monitoring: Yes [Christian] : Christian [FreeTextEntry1] : \par \par Quality: Type 2. \par Severity: Moderate ([facility])\par Duration: 18 years\par Onset: Presented with blurry vision\par Associated Symptoms: none\par Notes: Current regimen:\par 	Metformin 500 mg, two tabs BID\par 	Novolog	flexible dosing, up to 50 units a day, usually around 12 units\par 	Tresiba	34 units\par On Victoza in the past, d/c'ed due to insurance \par carries glucose tabs\par \par \par Blood Glucose Testing Information \par using dorita\par occasional hypoglycemia- usually when not eating a lot of carbs, mostly at night.\par \par \par Past Medical History\par Arthritis. Hypertension. Thyroid Problems. Diabetes. Reflux/Ulcers. Cholesterol. \par renal cysts\par \par Hypothyroid\par LT4 100 mcg daily- takes with all other medications 1 hour before breakfast\par Cytomel 12.5 mg daily- takes before lunch every day.\par \par s/p spine surgery. Labs afterwards revealed immature rbc forms; repeat appears to be better\par bothersome resting tremor, persistent during the day. Seen by neurology\par  [FreeTextEntry2] : 78 [FreeTextEntry3] : 21 [FreeTextEntry4] : 1 [de-identified] : 6.8 [FreeTextEntry5] : 147 [FreeTextEntry6] : 30.7

## 2022-03-30 NOTE — ASSESSMENT
[FreeTextEntry1] : DM type 2, well controlled\par hypothyroid, euthyroid on replacement\par mild rise in triglycerides can improve with better glycemic control

## 2022-04-12 ENCOUNTER — APPOINTMENT (OUTPATIENT)
Dept: ORTHOPEDIC SURGERY | Facility: CLINIC | Age: 72
End: 2022-04-12
Payer: COMMERCIAL

## 2022-04-12 VITALS — WEIGHT: 175 LBS | HEIGHT: 63 IN | BODY MASS INDEX: 31.01 KG/M2

## 2022-04-12 DIAGNOSIS — M75.52 BURSITIS OF LEFT SHOULDER: ICD-10-CM

## 2022-04-12 PROCEDURE — 99072 ADDL SUPL MATRL&STAF TM PHE: CPT

## 2022-04-12 PROCEDURE — 73030 X-RAY EXAM OF SHOULDER: CPT | Mod: LT

## 2022-04-12 PROCEDURE — 99213 OFFICE O/P EST LOW 20 MIN: CPT

## 2022-04-12 NOTE — ASSESSMENT
[FreeTextEntry1] : will do formal therapy. pt is tremulous has seen a neuro already. recommend second opinion
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
## 2022-04-12 NOTE — DATA REVIEWED
[MRI] : MRI [Left] : left [Shoulder] : shoulder [Report was reviewed and noted in the chart] : The report was reviewed and noted in the chart [I reviewed the films/CD and agree] : I reviewed the films/CD and agree [FreeTextEntry1] : IMPRESSION:\par \par \par Grade 3 partial-thickness bursal surface tear of the distal supraspinatus tendon\par extending into the insertion with accompanying subacromial subdeltoid bursitis.\par \par Grade 1 partial-thickness tearing of the infraspinatus and subscapularis\par tendons.\par \par Focal nondisplaced tear of the superior labrum.\par \par Moderate AC joint arthrosis.
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
## 2022-04-12 NOTE — PHYSICAL EXAM
[Left] : left shoulder [NL (0-180)] : full active abduction 0-180 degrees [There are no fractures, subluxations or dislocations. No significant abnormalities are seen] : There are no fractures, subluxations or dislocations. No significant abnormalities are seen [Type 2 acromion] : Type 2 acromion [AC Joint Arthrosis] : AC Joint Arthrosis [] : no trapezius tenderness [TWNoteComboBox6] : internal rotation L1 [de-identified] : external rotation 60 degrees

## 2022-05-05 ENCOUNTER — NON-APPOINTMENT (OUTPATIENT)
Age: 72
End: 2022-05-05

## 2022-05-05 ENCOUNTER — APPOINTMENT (OUTPATIENT)
Dept: OPHTHALMOLOGY | Facility: CLINIC | Age: 72
End: 2022-05-05
Payer: COMMERCIAL

## 2022-05-05 PROCEDURE — 92014 COMPRE OPH EXAM EST PT 1/>: CPT

## 2022-05-24 ENCOUNTER — APPOINTMENT (OUTPATIENT)
Dept: ORTHOPEDIC SURGERY | Facility: CLINIC | Age: 72
End: 2022-05-24
Payer: COMMERCIAL

## 2022-05-24 VITALS — BODY MASS INDEX: 31.01 KG/M2 | HEIGHT: 63 IN | WEIGHT: 175 LBS

## 2022-05-24 DIAGNOSIS — S43.432A SUPERIOR GLENOID LABRUM LESION OF LEFT SHOULDER, INITIAL ENCOUNTER: ICD-10-CM

## 2022-05-24 DIAGNOSIS — M19.012 PRIMARY OSTEOARTHRITIS, LEFT SHOULDER: ICD-10-CM

## 2022-05-24 DIAGNOSIS — S46.012A STRAIN OF MUSCLE(S) AND TENDON(S) OF THE ROTATOR CUFF OF LEFT SHOULDER, INITIAL ENCOUNTER: ICD-10-CM

## 2022-05-24 PROCEDURE — 99072 ADDL SUPL MATRL&STAF TM PHE: CPT

## 2022-05-24 PROCEDURE — 20610 DRAIN/INJ JOINT/BURSA W/O US: CPT | Mod: LT

## 2022-05-24 PROCEDURE — 99214 OFFICE O/P EST MOD 30 MIN: CPT | Mod: 25

## 2022-05-24 NOTE — PROCEDURE
[Large Joint Injection] : Large joint injection [Left] : of the left [Subacromial Space] : subacromial space [Betadine] : betadine [Ethyl Chloride sprayed topically] : ethyl chloride sprayed topically [Sterile technique used] : sterile technique used [___ cc    1%] : Lidocaine ~Vcc of 1%  [___ cc    32 units 5mg] : Zilretta ~Vcc of 32 units 5 mg  [Call if redness, pain or fever occur] : call if redness, pain or fever occur [Apply ice for 15min out of every hour for the next 12-24 hours as tolerated] : apply ice for 15 minutes out of every hour for the next 12-24 hours as tolerated [Previous OTC use and PT nontherapeutic] : patient has tried OTC's including aspirin, Ibuprofen, Aleve, etc or prescription NSAIDS, and/or exercises at home and/or physical therapy without satisfactory response [Patient had decreased mobility in the joint] : patient had decreased mobility in the joint [Risks, benefits, alternatives discussed / Verbal consent obtained] : the risks benefits, and alternatives have been discussed, and verbal consent was obtained
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
## 2022-05-24 NOTE — PHYSICAL EXAM
[NL (0-180)] : full active abduction 0-180 degrees [Left] : left shoulder [There are no fractures, subluxations or dislocations. No significant abnormalities are seen] : There are no fractures, subluxations or dislocations. No significant abnormalities are seen [Type 2 acromion] : Type 2 acromion [AC Joint Arthrosis] : AC Joint Arthrosis [] : no trapezius tenderness [TWNoteComboBox6] : internal rotation L1 [de-identified] : external rotation 60 degrees

## 2022-05-26 ENCOUNTER — NON-APPOINTMENT (OUTPATIENT)
Age: 72
End: 2022-05-26

## 2022-05-26 ENCOUNTER — APPOINTMENT (OUTPATIENT)
Dept: OPHTHALMOLOGY | Facility: CLINIC | Age: 72
End: 2022-05-26
Payer: COMMERCIAL

## 2022-05-26 PROCEDURE — 99213 OFFICE O/P EST LOW 20 MIN: CPT

## 2022-06-02 ENCOUNTER — RX RENEWAL (OUTPATIENT)
Age: 72
End: 2022-06-02

## 2022-06-05 ENCOUNTER — RX RENEWAL (OUTPATIENT)
Age: 72
End: 2022-06-05

## 2022-07-15 ENCOUNTER — TRANSCRIPTION ENCOUNTER (OUTPATIENT)
Age: 72
End: 2022-07-15

## 2022-07-29 ENCOUNTER — APPOINTMENT (OUTPATIENT)
Dept: ENDOCRINOLOGY | Facility: CLINIC | Age: 72
End: 2022-07-29

## 2022-08-09 LAB
HBA1C MFR BLD HPLC: 6.8
LDLC SERPL DIRECT ASSAY-MCNC: 38
MICROALBUMIN/CREAT 24H UR-RTO: 8

## 2022-08-10 ENCOUNTER — APPOINTMENT (OUTPATIENT)
Dept: ENDOCRINOLOGY | Facility: CLINIC | Age: 72
End: 2022-08-10

## 2022-08-10 VITALS
OXYGEN SATURATION: 97 % | BODY MASS INDEX: 31.54 KG/M2 | HEART RATE: 63 BPM | HEIGHT: 63 IN | DIASTOLIC BLOOD PRESSURE: 80 MMHG | SYSTOLIC BLOOD PRESSURE: 130 MMHG | WEIGHT: 178 LBS

## 2022-08-10 LAB — GLUCOSE BLDC GLUCOMTR-MCNC: 83

## 2022-08-10 PROCEDURE — 99214 OFFICE O/P EST MOD 30 MIN: CPT | Mod: 25

## 2022-08-10 PROCEDURE — 82962 GLUCOSE BLOOD TEST: CPT

## 2022-08-10 PROCEDURE — 95251 CONT GLUC MNTR ANALYSIS I&R: CPT

## 2022-08-10 RX ORDER — VALSARTAN AND HYDROCHLOROTHIAZIDE 160; 12.5 MG/1; MG/1
160-12.5 TABLET, FILM COATED ORAL
Qty: 180 | Refills: 0 | Status: DISCONTINUED | COMMUNITY
Start: 2022-06-06

## 2022-08-10 RX ORDER — FLASH GLUCOSE SENSOR
KIT MISCELLANEOUS
Qty: 6 | Refills: 3 | Status: DISCONTINUED | COMMUNITY
Start: 2019-05-04 | End: 2022-08-10

## 2022-08-10 RX ORDER — GABAPENTIN 300 MG/1
300 CAPSULE ORAL
Qty: 30 | Refills: 0 | Status: DISCONTINUED | COMMUNITY
Start: 2022-03-21

## 2022-08-10 RX ORDER — OXYCODONE AND ACETAMINOPHEN 5; 325 MG/1; MG/1
5-325 TABLET ORAL
Qty: 30 | Refills: 0 | Status: DISCONTINUED | COMMUNITY
Start: 2022-04-18

## 2022-08-10 RX ORDER — GABAPENTIN 400 MG/1
400 CAPSULE ORAL
Qty: 30 | Refills: 0 | Status: DISCONTINUED | COMMUNITY
Start: 2022-04-18

## 2022-08-10 RX ORDER — FLASH GLUCOSE SENSOR
KIT MISCELLANEOUS
Qty: 1 | Refills: 0 | Status: DISCONTINUED | COMMUNITY
Start: 1900-01-01 | End: 2022-08-10

## 2022-08-10 RX ORDER — TACROLIMUS 1 MG/G
0.1 OINTMENT TOPICAL
Qty: 60 | Refills: 0 | Status: DISCONTINUED | COMMUNITY
Start: 2022-07-27

## 2022-08-10 RX ORDER — CLOBETASOL PROPIONATE 0.5 MG/G
0.05 OINTMENT TOPICAL
Qty: 60 | Refills: 0 | Status: DISCONTINUED | COMMUNITY
Start: 2022-04-06

## 2022-08-10 RX ORDER — BACLOFEN 20 MG/1
20 TABLET ORAL
Qty: 30 | Refills: 0 | Status: DISCONTINUED | COMMUNITY
Start: 2022-07-13

## 2022-08-10 RX ORDER — NEOMYCIN AND POLYMYXIN B SULFATES AND DEXAMETHASONE 3.5; 10000; 1 MG/G; [IU]/G; MG/G
3.5-10000-0.1 OINTMENT OPHTHALMIC
Qty: 4 | Refills: 0 | Status: DISCONTINUED | COMMUNITY
Start: 2022-05-26

## 2022-08-10 RX ORDER — CEPHALEXIN 500 MG/1
500 CAPSULE ORAL
Qty: 10 | Refills: 0 | Status: DISCONTINUED | COMMUNITY
Start: 2022-05-26

## 2022-08-10 RX ORDER — FLASH GLUCOSE SENSOR
KIT MISCELLANEOUS
Qty: 6 | Refills: 0 | Status: DISCONTINUED | COMMUNITY
Start: 1900-01-01 | End: 2022-08-10

## 2022-08-10 NOTE — REVIEW OF SYSTEMS
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

## 2022-08-10 NOTE — PHYSICAL EXAM

## 2022-08-10 NOTE — HISTORY OF PRESENT ILLNESS
[FreeTextEntry1] : Still goes to pain management, did have a steroid injection about 10 weeks ago.\par Now diagnosed with essential tremors , on inderal. Neurologist expects hypoglycemia. \par Ruled out to not have parkinsons \par \par Quality: Type 2 DM\par Severity: Moderate\par Duration: 17 years\par Onset: Presented with blurry vision\par Associated Symptoms: none\par \par Current regimen:\par Metformin 500 mg, two tabs BID\par Novolo flexible dosing, up to 50 units a day, usually around 10-15 units, pre meal \par Tresiba 30 units at bedtime \par On Victoza in the past, d/c'ed due to insurance \par carries glucose tabs\par \par HGA1C 6.8%- 8/2022\par FS in office 83\par \par Weight: stable\par Exercise: biking 5 mins every hour daily\par Diet: no changes, two waffles and 4 sausages for breakfast, big salad for lunch, small dinner (small porition of meat with vegetable, occasionally has rice/potato) \par \par Eye exam: May 2022, no DR per patient\par Foot exam: last week, every 10 weeks, denies pain/numbness/tingling in B/L feet. Due to back; received injections and also oral steroids\par Kidney disease: sees nephro for kidney cysts\par Heart disease: none\par \par Blood Glucose Testing Information \par using christian\par occasional hypoglycemia- usually when not eating a lot of carbs, mostly at night.\par \par Past Medical History\par Arthritis. Hypertension. Thyroid Problems. Diabetes. Reflux/Ulcers. Cholesterol. \par renal cysts\par \par Hypothyroid\par LT4 100 mcg daily- takes with all other medications 1 hour before breakfast\par Cytomel 12.5 mg daily- takes before lunch every day.\par TSH 0.35, Total t4 8.7, Free T4 2.7, t3 uptake 31\par \par s/p spine surgery. Labs afterwards revealed immature rbc forms; repeat appears to be better\par \par Home Glucose Monitoring \par Continuous Glucose Monitoring: Yes \par CGM Device: Christian \par CGM %Time in Range: 81\par % High: 14\par Very high - 2%\par % Low: 2\par Very low 1%\par GMI: 6.5\par Glucose variability 32.9% \par \par HLD\par -On rosuvastatin but does experience leg cramps (prescribed by cardiologist) \par -Triglycerides 153, HDL 50, Total cholesterol 112, LDL 38

## 2022-08-10 NOTE — ASSESSMENT
[FreeTextEntry1] : DM type 2, if pt begins to have any episodes of blood sugars under  80, decrease Tresiba to 28 units at bedtime, if low blood sugars continues to occur advised pt. to call office for another Christian download. Continue Christian sensor. Repeat A1C before next visit. \par \par Hypothyroid, on slightly too much LT4. Take 1/2 tablet one day a week of LT4, full dose 6 days a week. Can continue Cytomel 12.5 mg daily. Repeat TFTs before next visit.\par \par Hyperlipidemia controlled with statin. Pt to advocate for a different statin due to leg crmaps. Repeat lipid panel before next visit.\par \par Fasting labs before next visit\par RTO in 3 months NP,  6 months with Dr. Garnica.

## 2022-09-23 ENCOUNTER — RX RENEWAL (OUTPATIENT)
Age: 72
End: 2022-09-23

## 2022-10-11 ENCOUNTER — TRANSCRIPTION ENCOUNTER (OUTPATIENT)
Age: 72
End: 2022-10-11

## 2022-10-12 ENCOUNTER — TRANSCRIPTION ENCOUNTER (OUTPATIENT)
Age: 72
End: 2022-10-12

## 2022-10-12 RX ORDER — FLASH GLUCOSE SCANNING READER
EACH MISCELLANEOUS
Qty: 1 | Refills: 0 | Status: ACTIVE | COMMUNITY
Start: 2022-08-10 | End: 1900-01-01

## 2022-10-16 ENCOUNTER — RX RENEWAL (OUTPATIENT)
Age: 72
End: 2022-10-16

## 2022-11-16 ENCOUNTER — NON-APPOINTMENT (OUTPATIENT)
Age: 72
End: 2022-11-16

## 2022-11-30 LAB
HBA1C MFR BLD HPLC: 6.8
LDLC SERPL DIRECT ASSAY-MCNC: 49
MICROALBUMIN/CREAT 24H UR-RTO: 3
TSH SERPL-ACNC: 0.35

## 2022-12-01 ENCOUNTER — RX RENEWAL (OUTPATIENT)
Age: 72
End: 2022-12-01

## 2022-12-01 ENCOUNTER — RESULT CHARGE (OUTPATIENT)
Age: 72
End: 2022-12-01

## 2022-12-01 ENCOUNTER — APPOINTMENT (OUTPATIENT)
Dept: ENDOCRINOLOGY | Facility: CLINIC | Age: 72
End: 2022-12-01

## 2022-12-01 VITALS
SYSTOLIC BLOOD PRESSURE: 120 MMHG | DIASTOLIC BLOOD PRESSURE: 80 MMHG | HEART RATE: 74 BPM | BODY MASS INDEX: 30.65 KG/M2 | WEIGHT: 173 LBS | OXYGEN SATURATION: 96 % | HEIGHT: 63 IN

## 2022-12-01 LAB — GLUCOSE BLDC GLUCOMTR-MCNC: 125

## 2022-12-01 PROCEDURE — 95251 CONT GLUC MNTR ANALYSIS I&R: CPT

## 2022-12-01 PROCEDURE — 99214 OFFICE O/P EST MOD 30 MIN: CPT | Mod: 25

## 2022-12-01 PROCEDURE — 82962 GLUCOSE BLOOD TEST: CPT

## 2022-12-01 NOTE — REVIEW OF SYSTEMS
[Recent Weight Gain (___ Lbs)] : recent weight gain: [unfilled] lbs [Chest Pain] : chest pain [Fatigue] : no fatigue [Recent Weight Loss (___ Lbs)] : no recent weight loss [Visual Field Defect] : no visual field defect [Blurred Vision] : no blurred vision [Dysphagia] : no dysphagia [Neck Pain] : no neck pain [Dysphonia] : no dysphonia [Shortness Of Breath] : no shortness of breath [Constipation] : no constipation [Diarrhea] : no diarrhea [Polyuria] : no polyuria [Nocturia] : no nocturia [Polydipsia] : no polydipsia [FreeTextEntry5] : intermittent, and followed by cardiology  [de-identified] : tremors improved

## 2022-12-01 NOTE — PHYSICAL EXAM

## 2022-12-01 NOTE — ASSESSMENT
[FreeTextEntry1] : DM type 2\par -On christian download often having lows at 6 pm, admits she is giving 10 units of novolog pre salad for lunch, she will decrease this to 5 units. \par -No other changes to oral medication or insulin regimen\par -Continue Christian sensor.\par - Repeat A1C before next visit. \par -Continue to watch diet and exercise as tolerated\par \par Hypothyroid, on slightly too much LT4 until we did the following dose change 2 weeks ago (she did not do it after last apt). Take 1/2 tablet one day a week of LT4, full dose 6 days a week. Can continue Cytomel 12.5 mg daily. Repeat TFTs in 4 weeks.\par \par Hyperlipidemia controlled with statin. Pt to advocate for a different statin due to leg cramps. Repeat lipid panel before next visit.\par \par Will stop vit b supplement but will take her MTV \par \par Fasting labs before next visit\par RTO in 3 months MD\par \par IN 2023- WILL NEED TO USE HUMALOG INSTEAD OF NOVOLOG DUE TO INSURANCE CHANGES.

## 2022-12-01 NOTE — HISTORY OF PRESENT ILLNESS
[FreeTextEntry1] : Had rotator cuff surgery on 10/27. \par Now diagnosed with essential tremors , on inderal. Neurologist expects hypoglycemia. \par Ruled out to not have parkinsons \par \par Quality: Type 2 DM\par Severity: Moderate\par Duration: 17 years\par Onset: Presented with blurry vision\par Associated Symptoms: none\par \par Current regimen:\par Metformin 500 mg, two tabs BID\par Novolog flexible dosing, up to 50 units a day, usually around 10-15 units, pre meal \par Tresiba 20 units at bedtime \par On Victoza in the past, d/c'ed due to insurance \par carries glucose tabs\par \par HGA1C 6.8%- 11/2022\par FS in office 125\par \par Weight: has lost 5 lbs\par Exercise: biking 5 mins every hour daily\par Diet: no changes, two waffles and 4 sausages for breakfast, big salad for lunch, small dinner (small porition of meat with vegetable, occasionally has rice/potato) \par \par Eye exam: May 2022, no DR per patient\par Foot exam: last week, every 10 weeks, denies pain/numbness/tingling in B/L feet. Due to back; received injections and also oral steroids\par Kidney disease: sees nephro for kidney cysts\par Heart disease: none\par \par Blood Glucose Testing Information \par using christian\par occasional hypoglycemia- usually when not eating a lot of carbs, mostly at night.\par \par Past Medical History\par Arthritis. Hypertension. Thyroid Problems. Diabetes. Reflux/Ulcers. Cholesterol. \par renal cysts\par \par Hypothyroid\par LT4 100 mcg daily- takes with all other medications 1 hour before breakfast (when the below labs resulted she was taking full tabs 7 days a week, now taking 1/2 tab one day a week, full tabs 6 days a week)\par Cytomel 12.5 mg daily- takes before lunch every day.\par TSH 0.35, free t4 1.3, Free T3 2.7\par \par Home Glucose Monitoring \par Continuous Glucose Monitoring: Yes \par CGM Device: Christian \par CGM %Time in Range: 83\par % High: 15\par Very high - 1%\par % Low: 1\par Very low 0%\par GMI: 6.7\par Glucose variability 26.4% \par \par HLD\par -On rosuvastatin but does experience leg cramps (prescribed by cardiologist) \par -Triglycerides 148, HDL 54, Total cholesterol 126, LDL 49\par \par Vit B12 Elevated\par At 1769 on labs

## 2022-12-03 ENCOUNTER — TRANSCRIPTION ENCOUNTER (OUTPATIENT)
Age: 72
End: 2022-12-03

## 2022-12-09 ENCOUNTER — TRANSCRIPTION ENCOUNTER (OUTPATIENT)
Age: 72
End: 2022-12-09

## 2023-01-03 ENCOUNTER — NON-APPOINTMENT (OUTPATIENT)
Age: 73
End: 2023-01-03

## 2023-02-28 LAB
HBA1C MFR BLD HPLC: 7.1
LDLC SERPL DIRECT ASSAY-MCNC: 58
MICROALBUMIN/CREAT 24H UR-RTO: 5
TSH SERPL-ACNC: 2.11

## 2023-03-01 ENCOUNTER — APPOINTMENT (OUTPATIENT)
Dept: ENDOCRINOLOGY | Facility: CLINIC | Age: 73
End: 2023-03-01
Payer: COMMERCIAL

## 2023-03-01 VITALS
BODY MASS INDEX: 31.54 KG/M2 | HEIGHT: 63 IN | OXYGEN SATURATION: 96 % | HEART RATE: 88 BPM | SYSTOLIC BLOOD PRESSURE: 118 MMHG | DIASTOLIC BLOOD PRESSURE: 66 MMHG | WEIGHT: 178 LBS

## 2023-03-01 LAB — GLUCOSE BLDC GLUCOMTR-MCNC: 232

## 2023-03-01 PROCEDURE — 99214 OFFICE O/P EST MOD 30 MIN: CPT | Mod: 25

## 2023-03-01 PROCEDURE — 95251 CONT GLUC MNTR ANALYSIS I&R: CPT

## 2023-03-01 NOTE — HISTORY OF PRESENT ILLNESS
[FreeTextEntry1] : \par Now diagnosed with essential tremors , on inderal. Neurologist expects hypoglycemia. \par Ruled out  parkinsons \par \par Quality: Type 2 DM\par Severity: Moderate\par Duration: 18 years\par Onset: Presented with blurry vision\par Associated Symptoms: none\par \par Current regimen:\par Metformin 500 mg, two tabs BID\par Novolog flexible dosing, up to 50 units a day, usually around 10-15 units, pre meal \par Tresiba 20 units at bedtime \par On Victoza in the past, d/c'ed due to insurance \par carries glucose tabs\par \par HGA1C 6.8%- 11/2022\par FS in office 125\par \par Weight: has lost 5 lbs\par Exercise: biking 5 mins every hour daily\par Diet: no changes, two waffles and 4 sausages for breakfast, big salad for lunch, small dinner (small porition of meat with vegetable, occasionally has rice/potato) \par \par Eye exam: May 2022, no DR per patient\par Foot exam: last week, every 10 weeks, denies pain/numbness/tingling in B/L feet. Due to back; received injections and also oral steroids\par Kidney disease: sees nephro for kidney cysts\par Heart disease: none\par \par Blood Glucose Testing Information \par using christian\par occasional hypoglycemia- usually when not eating a lot of carbs, mostly at night.\par \par Past Medical History\par Arthritis. Hypertension. Thyroid Problems. Diabetes. Reflux/Ulcers. Cholesterol. \par renal cysts\par \par Hypothyroid\par LT4 100 mcg daily- takes with all other medications 1 hour before breakfast (when the below labs resulted she was taking full tabs 7 days a week, now taking 1/2 tab one day a week, full tabs 6 days a week)\par Cytomel 12.5 mg daily- takes before lunch every day.\par TSH 0.35, free t4 1.3, Free T3 2.7\par \par Home Glucose Monitoring \par Continuous Glucose Monitoring: Yes \par CGM Device: Christian \par CGM %Time in Range: 83\par % High: 15\par Very high - 1%\par % Low: 1\par Very low 0%\par GMI: 6.7\par Glucose variability 26.4% \par \par HLD\par -On rosuvastatin but does experience leg cramps (prescribed by cardiologist) \par -Triglycerides 148, HDL 54, Total cholesterol 126, LDL 49\par \par Vit B12 Elevated\par At 1769 on labs   [Continuous Glucose Monitoring] : Continuous Glucose Monitoring: Yes [Christian] : Christian [Other:______] : [unfilled] [FreeTextEntry2] : 78 [FreeTextEntry3] : 22 [FreeTextEntry4] : 0 [de-identified] : 6.9 [FreeTextEntry5] : 151 [FreeTextEntry6] : 28.2

## 2023-03-01 NOTE — ASSESSMENT
[FreeTextEntry1] : DM type 2, insulin treated, well controlled\par Hypothyroid, euthyroid on replacement\par Hyperlipidemia controlled\par \par

## 2023-04-03 ENCOUNTER — TRANSCRIPTION ENCOUNTER (OUTPATIENT)
Age: 73
End: 2023-04-03

## 2023-04-18 ENCOUNTER — TRANSCRIPTION ENCOUNTER (OUTPATIENT)
Age: 73
End: 2023-04-18

## 2023-05-05 ENCOUNTER — NON-APPOINTMENT (OUTPATIENT)
Age: 73
End: 2023-05-05

## 2023-05-18 ENCOUNTER — NON-APPOINTMENT (OUTPATIENT)
Age: 73
End: 2023-05-18

## 2023-05-18 ENCOUNTER — APPOINTMENT (OUTPATIENT)
Dept: OPHTHALMOLOGY | Facility: CLINIC | Age: 73
End: 2023-05-18
Payer: COMMERCIAL

## 2023-05-18 ENCOUNTER — APPOINTMENT (OUTPATIENT)
Dept: OPHTHALMOLOGY | Facility: CLINIC | Age: 73
End: 2023-05-18
Payer: SELF-PAY

## 2023-05-18 PROCEDURE — 92134 CPTRZ OPH DX IMG PST SGM RTA: CPT

## 2023-05-18 PROCEDURE — 92015 DETERMINE REFRACTIVE STATE: CPT

## 2023-05-18 PROCEDURE — 92014 COMPRE OPH EXAM EST PT 1/>: CPT

## 2023-05-31 ENCOUNTER — APPOINTMENT (OUTPATIENT)
Dept: ENDOCRINOLOGY | Facility: CLINIC | Age: 73
End: 2023-05-31
Payer: COMMERCIAL

## 2023-05-31 VITALS
HEART RATE: 77 BPM | SYSTOLIC BLOOD PRESSURE: 118 MMHG | BODY MASS INDEX: 31.89 KG/M2 | HEIGHT: 63 IN | DIASTOLIC BLOOD PRESSURE: 78 MMHG | WEIGHT: 180 LBS | OXYGEN SATURATION: 99 %

## 2023-05-31 LAB
GLUCOSE BLDC GLUCOMTR-MCNC: 158
HBA1C MFR BLD HPLC: 6.7
LDLC SERPL DIRECT ASSAY-MCNC: 47
TSH SERPL-ACNC: 2.14

## 2023-05-31 PROCEDURE — 82962 GLUCOSE BLOOD TEST: CPT

## 2023-05-31 PROCEDURE — 99214 OFFICE O/P EST MOD 30 MIN: CPT | Mod: 25

## 2023-05-31 PROCEDURE — 95251 CONT GLUC MNTR ANALYSIS I&R: CPT

## 2023-05-31 RX ORDER — INSULIN ASPART 100 [IU]/ML
100 INJECTION, SOLUTION INTRAVENOUS; SUBCUTANEOUS
Qty: 3 | Refills: 3 | Status: DISCONTINUED | COMMUNITY
Start: 2022-06-05 | End: 2023-05-31

## 2023-05-31 RX ORDER — LIDOCAINE 5% 700 MG/1
5 PATCH TOPICAL
Qty: 60 | Refills: 0 | Status: DISCONTINUED | COMMUNITY
Start: 2022-06-15 | End: 2023-05-31

## 2023-05-31 RX ORDER — INSULIN DEGLUDEC INJECTION 200 U/ML
200 INJECTION, SOLUTION SUBCUTANEOUS
Qty: 4 | Refills: 3 | Status: DISCONTINUED | COMMUNITY
Start: 1900-01-01 | End: 2023-05-31

## 2023-05-31 RX ORDER — BLOOD SUGAR DIAGNOSTIC
STRIP MISCELLANEOUS 4 TIMES DAILY
Qty: 4 | Refills: 3 | Status: DISCONTINUED | COMMUNITY
Start: 2018-11-09 | End: 2023-05-31

## 2023-05-31 RX ORDER — DULOXETINE HYDROCHLORIDE 20 MG/1
20 CAPSULE, DELAYED RELEASE PELLETS ORAL
Qty: 30 | Refills: 0 | Status: DISCONTINUED | COMMUNITY
Start: 2020-09-22 | End: 2023-05-31

## 2023-05-31 RX ORDER — BACLOFEN 10 MG/1
10 TABLET ORAL
Refills: 0 | Status: DISCONTINUED | COMMUNITY
End: 2023-05-31

## 2023-05-31 NOTE — HISTORY OF PRESENT ILLNESS
[FreeTextEntry1] : Quality: Type 2 DM\par Severity: Moderate\par Duration: 17 years\par Onset: Presented with blurry vision\par Associated Symptoms: none\par \par Current regimen:\par Metformin 500 mg, two tabs BID\par Novolog flexible dosing, up to 50 units a day, usually around 10-15 units, pre meal - now humalog is covered \par Tresiba 20 units at bedtime - now lantus is covered  \par On Victoza in the past, d/c'ed due to insurance \par carries glucose tabs\par \par HGA1C 6.7%- 5/2023\par FS in office 158\par \par Weight: now stable\par Exercise: biking 10 mins every few hours daily\par Diet: no changes, two waffles and 4 sausages for breakfast, big salad for lunch, small dinner (small porition of meat with vegetable, occasionally has rice/potato) \par \par Eye exam: May 2023, no DR per patient\par Foot exam:  every 10 weeks, denies pain/numbness/tingling in B/L feet. Due to back; received injections and also oral steroids\par Kidney disease: sees nephro for kidney cysts\par Heart disease: none\par \par Blood Glucose Testing Information \par using christian\par occasional hypoglycemia- usually when not eating a lot of carbs, mostly at night.\par \par Past Medical History\par Arthritis. Hypertension. Thyroid Problems. Diabetes. Reflux/Ulcers. Cholesterol. \par renal cysts\par \par Hypothyroid\par LT4 100 mcg daily- now taking 1/2 tab one day a week, full tabs 6 days a week\par Cytomel 12.5 mg daily- takes before lunch every day.\par TSH 2.14, free t4 3.0\par \par Home Glucose Monitoring \par Continuous Glucose Monitoring: Yes \par CGM Device: Christian \par CGM %Time in Range: 81\par % High: 17\par Very high - 12\par % Low: 0\par Very low 0%\par GMI: 6.8\par Glucose variability 29.1% \par \par HLD\par -On rosuvastatin but does experience leg cramps (prescribed by cardiologist) \par -Triglycerides 122, HDL 58, Total cholesterol 125, LDL 47\par \par Vit B12 Elevated\par No updated levels on labs

## 2023-05-31 NOTE — REVIEW OF SYSTEMS
[Fatigue] : no fatigue [Recent Weight Gain (___ Lbs)] : no recent weight gain [Recent Weight Loss (___ Lbs)] : no recent weight loss [Visual Field Defect] : no visual field defect [Blurred Vision] : no blurred vision [Dysphagia] : no dysphagia [Neck Pain] : no neck pain [Dysphonia] : no dysphonia [Chest Pain] : no chest pain [Shortness Of Breath] : no shortness of breath [Constipation] : no constipation [Diarrhea] : no diarrhea [Polyuria] : no polyuria

## 2023-05-31 NOTE — ASSESSMENT
[FreeTextEntry1] : DM type 2\par -Pt doing well- hga1c at goal \par -No changes to oral medication or insulin regimen except the insulin coverage is now changing \par -Continue Christian sensor.\par -Repeat A1C before next visit. \par -Continue to watch diet and exercise as tolerated\par \par Hypothyroid. Continue LT4 100 mcg- Take 1/2 tablet one day a week of LT4, full dose 6 days a week. Can continue Cytomel 12.5 mg daily. \par \par Hyperlipidemia controlled with statin. Pt to advocate for a different statin due to leg cramps. Repeat lipid panel before next visit.\par \par Will stop vit b supplement but will take her MTV , need updated levels with next labs\par \par Fasting labs before next visit\par RTO in 3 months MD\par \par IN 2023- WILL NEED TO USE HUMALOG INSTEAD OF NOVOLOG DUE TO INSURANCE CHANGES.

## 2023-05-31 NOTE — PHYSICAL EXAM

## 2023-06-01 ENCOUNTER — APPOINTMENT (OUTPATIENT)
Dept: ENDOCRINOLOGY | Facility: CLINIC | Age: 73
End: 2023-06-01

## 2023-06-05 ENCOUNTER — TRANSCRIPTION ENCOUNTER (OUTPATIENT)
Age: 73
End: 2023-06-05

## 2023-06-16 ENCOUNTER — RX RENEWAL (OUTPATIENT)
Age: 73
End: 2023-06-16

## 2023-06-19 ENCOUNTER — RX RENEWAL (OUTPATIENT)
Age: 73
End: 2023-06-19

## 2023-06-25 ENCOUNTER — RX RENEWAL (OUTPATIENT)
Age: 73
End: 2023-06-25

## 2023-06-25 RX ORDER — LEVOTHYROXINE SODIUM 0.1 MG/1
100 TABLET ORAL
Qty: 90 | Refills: 3 | Status: ACTIVE | COMMUNITY
Start: 2022-10-16 | End: 1900-01-01

## 2023-09-05 LAB
HBA1C MFR BLD HPLC: 6.8
LDLC SERPL DIRECT ASSAY-MCNC: 51
TSH SERPL-ACNC: 1.16

## 2023-09-06 ENCOUNTER — APPOINTMENT (OUTPATIENT)
Dept: ENDOCRINOLOGY | Facility: CLINIC | Age: 73
End: 2023-09-06
Payer: COMMERCIAL

## 2023-09-06 VITALS
BODY MASS INDEX: 32.07 KG/M2 | SYSTOLIC BLOOD PRESSURE: 110 MMHG | WEIGHT: 181 LBS | OXYGEN SATURATION: 98 % | HEIGHT: 63 IN | DIASTOLIC BLOOD PRESSURE: 54 MMHG | HEART RATE: 88 BPM

## 2023-09-06 LAB — GLUCOSE BLDC GLUCOMTR-MCNC: 117

## 2023-09-06 PROCEDURE — 99214 OFFICE O/P EST MOD 30 MIN: CPT | Mod: 25

## 2023-09-06 PROCEDURE — 95251 CONT GLUC MNTR ANALYSIS I&R: CPT

## 2023-09-06 PROCEDURE — 82962 GLUCOSE BLOOD TEST: CPT

## 2023-09-06 NOTE — ASSESSMENT
[FreeTextEntry1] : DM type 2 -Pt doing well- hga1c at goal  -Will try to increase Tresiba to 32 units and decrease pre dinner insulin to max 10-12 units -No other changes to insulin or Metformin   -Continue Christian sensor. -Repeat A1C before next visit.  -Continue to watch diet and exercise as tolerated  Hypothyroid. Continue LT4 100 mcg- Take 1/2 tablet one day a week of LT4, full dose 6 days a week. Can continue Cytomel 12.5 mg daily.   Hyperlipidemia controlled with statin  Begin daily 1000 mcg vit b supplement   Fasting labs before next visit RTO in 3 months MD

## 2023-09-06 NOTE — PHYSICAL EXAM
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
## 2023-09-07 ENCOUNTER — RX RENEWAL (OUTPATIENT)
Age: 73
End: 2023-09-07

## 2023-10-04 ENCOUNTER — RX RENEWAL (OUTPATIENT)
Age: 73
End: 2023-10-04

## 2023-10-04 RX ORDER — INSULIN GLARGINE 100 [IU]/ML
100 INJECTION, SOLUTION SUBCUTANEOUS DAILY
Qty: 3 | Refills: 1 | Status: ACTIVE | COMMUNITY
Start: 2023-05-31 | End: 1900-01-01

## 2023-12-22 LAB
HBA1C MFR BLD HPLC: 6.9
LDLC SERPL DIRECT ASSAY-MCNC: 51
TSH SERPL-ACNC: 1.14

## 2023-12-27 ENCOUNTER — APPOINTMENT (OUTPATIENT)
Dept: ENDOCRINOLOGY | Facility: CLINIC | Age: 73
End: 2023-12-27
Payer: COMMERCIAL

## 2023-12-27 VITALS
WEIGHT: 180 LBS | HEART RATE: 92 BPM | HEIGHT: 63 IN | OXYGEN SATURATION: 96 % | BODY MASS INDEX: 31.89 KG/M2 | SYSTOLIC BLOOD PRESSURE: 122 MMHG | DIASTOLIC BLOOD PRESSURE: 70 MMHG

## 2023-12-27 LAB — GLUCOSE BLDC GLUCOMTR-MCNC: 148

## 2023-12-27 PROCEDURE — 99214 OFFICE O/P EST MOD 30 MIN: CPT | Mod: 25

## 2023-12-27 PROCEDURE — 95251 CONT GLUC MNTR ANALYSIS I&R: CPT

## 2023-12-27 RX ORDER — ROSUVASTATIN CALCIUM 10 MG/1
10 TABLET, FILM COATED ORAL
Refills: 0 | Status: ACTIVE | COMMUNITY

## 2023-12-27 RX ORDER — INSULIN LISPRO 100 [IU]/ML
100 INJECTION, SOLUTION INTRAVENOUS; SUBCUTANEOUS
Qty: 4 | Refills: 3 | Status: ACTIVE | COMMUNITY
Start: 2023-05-31 | End: 1900-01-01

## 2023-12-27 RX ORDER — AMLODIPINE BESYLATE 2.5 MG/1
2.5 TABLET ORAL
Refills: 0 | Status: ACTIVE | COMMUNITY

## 2023-12-27 RX ORDER — PROPRANOLOL HYDROCHLORIDE 20 MG/1
20 TABLET ORAL
Qty: 180 | Refills: 3 | Status: ACTIVE | COMMUNITY

## 2023-12-27 NOTE — HISTORY OF PRESENT ILLNESS
[Continuous Glucose Monitoring] : Continuous Glucose Monitoring: Yes [Christian] : Christian [FreeTextEntry1] :  Quality: Type 2 DM Severity: Moderate Duration: 17 years Onset: Presented with blurry vision Associated Symptoms: none  Current regimen: Metformin 500 mg, two tabs BID Humalog flexible dosing, up to 50 units a day, usually around 10-12 units, pre meal  Tresiba 28 units at bedtime - now lantus is covered   On Victoza in the past, d/c'ed due to insurance  Yeast infections with SGLT2 carries glucose tabs  HGA1C 6.8%- 8/2023 FS in office 117  Average glucose 151 GMI 6.9% Glucose variability 28.6%  Very high 3% High 20% In range 77% Low 0% Very low 0%  Some lows overnight but then highs in AM  Weight: now stable Exercise: biking 10 mins every few hours daily Diet: no changes, two waffles and 4 sausages for breakfast, big salad for lunch, small dinner (small porition of meat with vegetable, occasionally has rice/potato)   Eye exam: May 2023, no DR per patient Foot exam:  every 10 weeks, denies pain/numbness/tingling in B/L feet. Due to back; received injections and also oral steroids Kidney disease: sees nephro for kidney cysts Heart disease: none  Blood Glucose Testing Information  using dorita occasional hypoglycemia- usually when not eating a lot of carbs, mostly at night.  Past Medical History Arthritis. Hypertension. Thyroid Problems. Diabetes. Reflux/Ulcers. Cholesterol.  renal cysts traumatic fracture of right ulna  Hypothyroid LT4 100 mcg daily- now taking 1/2 tab one day a week, full tabs 6 days a week Cytomel 12.5 mg daily- takes before lunch every day. TSH 1.16, Free T4 1.3  HLD -On rosuvastatin but does experience leg cramps (prescribed by cardiologist)  -Triglycerides 171, HDL 57, Total cholesterol 133, LDL 51  Vit B12 Elevated Low normal on labs, unsure if she is no supplement  [FreeTextEntry2] : 87 [FreeTextEntry3] : 10 [FreeTextEntry4] : 3 [de-identified] : 6.4 [FreeTextEntry5] : 128 [FreeTextEntry6] : 32.5

## 2023-12-27 NOTE — ASSESSMENT
[FreeTextEntry1] : well controlled diabetes. Needs less dinner insulin at times; may also need to reduce basal insulin slightly hypothyroid, euthyroid on replacement hyperlipidemia controlled

## 2023-12-28 ENCOUNTER — TRANSCRIPTION ENCOUNTER (OUTPATIENT)
Age: 73
End: 2023-12-28

## 2024-01-31 ENCOUNTER — TRANSCRIPTION ENCOUNTER (OUTPATIENT)
Age: 74
End: 2024-01-31

## 2024-02-09 ENCOUNTER — RX RENEWAL (OUTPATIENT)
Age: 74
End: 2024-02-09

## 2024-03-05 LAB
HBA1C MFR BLD HPLC: 7.1
LDLC SERPL DIRECT ASSAY-MCNC: 68
MICROALBUMIN/CREAT 24H UR-RTO: NORMAL
TSH SERPL-ACNC: 4.01

## 2024-03-06 ENCOUNTER — APPOINTMENT (OUTPATIENT)
Dept: ENDOCRINOLOGY | Facility: CLINIC | Age: 74
End: 2024-03-06
Payer: COMMERCIAL

## 2024-03-06 VITALS
HEIGHT: 63 IN | BODY MASS INDEX: 31.89 KG/M2 | DIASTOLIC BLOOD PRESSURE: 64 MMHG | WEIGHT: 180 LBS | HEART RATE: 92 BPM | OXYGEN SATURATION: 98 % | SYSTOLIC BLOOD PRESSURE: 125 MMHG

## 2024-03-06 DIAGNOSIS — R25.2 CRAMP AND SPASM: ICD-10-CM

## 2024-03-06 DIAGNOSIS — E11.65 TYPE 2 DIABETES MELLITUS WITH HYPERGLYCEMIA: ICD-10-CM

## 2024-03-06 DIAGNOSIS — E78.2 MIXED HYPERLIPIDEMIA: ICD-10-CM

## 2024-03-06 DIAGNOSIS — E03.9 HYPOTHYROIDISM, UNSPECIFIED: ICD-10-CM

## 2024-03-06 DIAGNOSIS — E11.9 TYPE 2 DIABETES MELLITUS W/OUT COMPLICATIONS: ICD-10-CM

## 2024-03-06 DIAGNOSIS — R74.8 ABNORMAL LEVELS OF OTHER SERUM ENZYMES: ICD-10-CM

## 2024-03-06 LAB — GLUCOSE BLDC GLUCOMTR-MCNC: 149

## 2024-03-06 PROCEDURE — 95251 CONT GLUC MNTR ANALYSIS I&R: CPT

## 2024-03-06 PROCEDURE — G2211 COMPLEX E/M VISIT ADD ON: CPT

## 2024-03-06 PROCEDURE — 82962 GLUCOSE BLOOD TEST: CPT

## 2024-03-06 PROCEDURE — 99214 OFFICE O/P EST MOD 30 MIN: CPT

## 2024-03-06 RX ORDER — FLASH GLUCOSE SENSOR
KIT MISCELLANEOUS
Qty: 6 | Refills: 3 | Status: ACTIVE | COMMUNITY
Start: 2022-08-10 | End: 1900-01-01

## 2024-03-06 RX ORDER — PEN NEEDLE, DIABETIC 29 G X1/2"
31G X 5 MM NEEDLE, DISPOSABLE MISCELLANEOUS
Qty: 400 | Refills: 3 | Status: ACTIVE | COMMUNITY
Start: 2018-11-09 | End: 1900-01-01

## 2024-03-06 NOTE — REVIEW OF SYSTEMS
[Fatigue] : fatigue [Recent Weight Gain (___ Lbs)] : no recent weight gain [Visual Field Defect] : no visual field defect [Blurred Vision] : no blurred vision [Dysphagia] : no dysphagia [Neck Pain] : no neck pain [Dysphonia] : no dysphonia [Chest Pain] : no chest pain [Shortness Of Breath] : no shortness of breath [Constipation] : no constipation [Diarrhea] : no diarrhea [Polyuria] : no polyuria [Muscle Cramps] : muscle cramps [Polydipsia] : no polydipsia

## 2024-03-06 NOTE — ASSESSMENT
[FreeTextEntry1] : DM type 2 -Pt doing well however she must stop and check a FS before treating a low that may not be accurate  -No changes to insulin regimen at this time  -Continue  mg 2 tabs BID -Pt states she doesnt feel a glp1 is appropriate, her portion control is already excellent  -Continue Christian sensor. -Repeat A1C before next visit.  -Continue to watch diet and exercise as tolerated  Hypothyroid. Continue LT4 100 mcg- Take 1/2 tablet one day a week of LT4, full dose 6 days a week. Can continue Cytomel 12.5 mg daily.   Hyperlipidemia controlled with statin  Continue daily 1000 mcg vit b supplement   Vit B and Mag checked today in office to rule out an electrolyte/vitamin def reasoning for her muscle cramps  Fasting labs before next visit RTO in 3 months MD

## 2024-03-06 NOTE — HISTORY OF PRESENT ILLNESS
[FreeTextEntry1] : Interval history: No events  Having leg cramping Very tired  Quality: Type 2 DM Severity: Moderate Duration: 17 years Onset: Presented with blurry vision Associated Symptoms: none  Current regimen: Metformin 500 mg, two tabs BID Humalog flexible dosing, up to 50 units a day, usually around 12-15 units, rare 20 units pre meal  Tresiba 30 units at bedtime - now lantus is covered   On Victoza in the past, d/c'ed due to insurance  Yeast infections with SGLT2 carries glucose tabs  HGA1C 7.1-2/2024 FS in office 149- bfast  Average glucose 164 GMI 7.2% Glucose variability 28.0%  Very high 3% High 30% In range 66% Low 1% Very low 0%  Some lows overnight but then highs in AM because she corrected, but did not check a FS to make a true determination if low is real  Weight: now stable Exercise: biking 10 mins every few hours daily Diet: no changes, two waffles and 4 sausages for breakfast, big salad for lunch, small dinner (small porition of meat with vegetable, occasionally has rice/potato)   Eye exam: May 2023, no DR per patient Foot exam:  every 10 weeks, denies pain/numbness/tingling in B/L feet. Due to back; received injections and also oral steroids Kidney disease: sees nephro for kidney cysts Heart disease: none  Blood Glucose Testing Information  using dorita occasional hypoglycemia- usually when not eating a lot of carbs, mostly at night.  Past Medical History Arthritis. Hypertension. Thyroid Problems. Diabetes. Reflux/Ulcers. Cholesterol.  renal cysts  Hypothyroid LT4 100 mcg daily- now taking 1/2 tab one day a week, full tabs 6 days a week Cytomel 12.5 mg daily- takes before lunch every day. TSH 4.01, Free T4 2.6  HLD -On rosuvastatin but does experience leg cramps (prescribed by cardiologist)  -Triglycerides 246, HDL 58, Total cholesterol 160, LDL 68  Vit B12 Elevated No levels checked on labs, unsure if she is no supplement

## 2024-03-06 NOTE — PHYSICAL EXAM

## 2024-03-07 LAB
MAGNESIUM SERPL-MCNC: 1.8 MG/DL
VIT B12 SERPL-MCNC: 401 PG/ML

## 2024-06-17 ENCOUNTER — TRANSCRIPTION ENCOUNTER (OUTPATIENT)
Age: 74
End: 2024-06-17

## 2024-07-02 ENCOUNTER — TRANSCRIPTION ENCOUNTER (OUTPATIENT)
Age: 74
End: 2024-07-02

## 2024-07-16 LAB
HBA1C MFR BLD HPLC: 7.1
LDLC SERPL DIRECT ASSAY-MCNC: 95
MICROALBUMIN/CREAT 24H UR-RTO: 7
TSH SERPL-ACNC: 2.09

## 2024-07-17 ENCOUNTER — APPOINTMENT (OUTPATIENT)
Dept: ENDOCRINOLOGY | Facility: CLINIC | Age: 74
End: 2024-07-17
Payer: MEDICARE

## 2024-07-17 VITALS
DIASTOLIC BLOOD PRESSURE: 84 MMHG | WEIGHT: 180 LBS | HEIGHT: 63 IN | OXYGEN SATURATION: 96 % | SYSTOLIC BLOOD PRESSURE: 122 MMHG | HEART RATE: 69 BPM | BODY MASS INDEX: 31.89 KG/M2

## 2024-07-17 DIAGNOSIS — E78.2 MIXED HYPERLIPIDEMIA: ICD-10-CM

## 2024-07-17 DIAGNOSIS — E11.65 TYPE 2 DIABETES MELLITUS WITH HYPERGLYCEMIA: ICD-10-CM

## 2024-07-17 DIAGNOSIS — E03.9 HYPOTHYROIDISM, UNSPECIFIED: ICD-10-CM

## 2024-07-17 LAB — GLUCOSE BLDC GLUCOMTR-MCNC: 254

## 2024-07-17 PROCEDURE — G2211 COMPLEX E/M VISIT ADD ON: CPT

## 2024-07-17 PROCEDURE — 82962 GLUCOSE BLOOD TEST: CPT

## 2024-07-17 PROCEDURE — 99214 OFFICE O/P EST MOD 30 MIN: CPT

## 2024-07-17 PROCEDURE — 95251 CONT GLUC MNTR ANALYSIS I&R: CPT

## 2024-07-17 RX ORDER — INSULIN ASPART 100 [IU]/ML
100 INJECTION, SOLUTION INTRAVENOUS; SUBCUTANEOUS
Qty: 3 | Refills: 3 | Status: ACTIVE | COMMUNITY
Start: 2024-07-17 | End: 1900-01-01

## 2024-07-17 RX ORDER — INSULIN GLARGINE-YFGN 100 [IU]/ML
100 INJECTION, SOLUTION SUBCUTANEOUS
Qty: 75 | Refills: 0 | Status: ACTIVE | COMMUNITY
Start: 2024-07-17 | End: 1900-01-01

## 2024-08-19 ENCOUNTER — TRANSCRIPTION ENCOUNTER (OUTPATIENT)
Age: 74
End: 2024-08-19

## 2024-09-30 ENCOUNTER — TRANSCRIPTION ENCOUNTER (OUTPATIENT)
Age: 74
End: 2024-09-30

## 2024-10-22 LAB
HBA1C MFR BLD HPLC: 6.8
LDLC SERPL DIRECT ASSAY-MCNC: 19
MICROALBUMIN/CREAT 24H UR-RTO: 6
TSH SERPL-ACNC: 2.13

## 2024-10-23 ENCOUNTER — APPOINTMENT (OUTPATIENT)
Dept: ENDOCRINOLOGY | Facility: CLINIC | Age: 74
End: 2024-10-23
Payer: MEDICARE

## 2024-10-23 VITALS
SYSTOLIC BLOOD PRESSURE: 135 MMHG | BODY MASS INDEX: 32.43 KG/M2 | DIASTOLIC BLOOD PRESSURE: 80 MMHG | WEIGHT: 183 LBS | HEIGHT: 63 IN | OXYGEN SATURATION: 94 %

## 2024-10-23 DIAGNOSIS — E11.65 TYPE 2 DIABETES MELLITUS WITH HYPERGLYCEMIA: ICD-10-CM

## 2024-10-23 DIAGNOSIS — E11.9 TYPE 2 DIABETES MELLITUS W/OUT COMPLICATIONS: ICD-10-CM

## 2024-10-23 DIAGNOSIS — R79.89 OTHER SPECIFIED ABNORMAL FINDINGS OF BLOOD CHEMISTRY: ICD-10-CM

## 2024-10-23 DIAGNOSIS — E78.2 MIXED HYPERLIPIDEMIA: ICD-10-CM

## 2024-10-23 DIAGNOSIS — E03.9 HYPOTHYROIDISM, UNSPECIFIED: ICD-10-CM

## 2024-10-23 LAB — GLUCOSE BLDC GLUCOMTR-MCNC: 217

## 2024-10-23 PROCEDURE — 99214 OFFICE O/P EST MOD 30 MIN: CPT

## 2024-10-23 PROCEDURE — 95251 CONT GLUC MNTR ANALYSIS I&R: CPT

## 2024-10-23 PROCEDURE — G2211 COMPLEX E/M VISIT ADD ON: CPT

## 2024-10-23 PROCEDURE — 82962 GLUCOSE BLOOD TEST: CPT

## 2024-10-23 RX ORDER — EVOLOCUMAB 140 MG/ML
140 INJECTION, SOLUTION SUBCUTANEOUS
Refills: 0 | Status: ACTIVE | COMMUNITY

## 2024-10-23 RX ORDER — INSULIN GLARGINE-YFGN 100 [IU]/ML
100 INJECTION, SOLUTION SUBCUTANEOUS
Qty: 3 | Refills: 0 | Status: ACTIVE | COMMUNITY
Start: 2024-10-23 | End: 1900-01-01

## 2024-11-15 ENCOUNTER — RX RENEWAL (OUTPATIENT)
Age: 74
End: 2024-11-15

## 2025-01-22 LAB
HBA1C MFR BLD HPLC: 6.9
LDLC SERPL DIRECT ASSAY-MCNC: 77
MICROALBUMIN/CREAT 24H UR-RTO: NORMAL
TSH SERPL-ACNC: 1.87

## 2025-01-23 ENCOUNTER — APPOINTMENT (OUTPATIENT)
Dept: ENDOCRINOLOGY | Facility: CLINIC | Age: 75
End: 2025-01-23
Payer: MEDICARE

## 2025-01-23 ENCOUNTER — NON-APPOINTMENT (OUTPATIENT)
Age: 75
End: 2025-01-23

## 2025-01-23 VITALS
WEIGHT: 180 LBS | HEART RATE: 68 BPM | DIASTOLIC BLOOD PRESSURE: 60 MMHG | SYSTOLIC BLOOD PRESSURE: 130 MMHG | OXYGEN SATURATION: 97 % | HEIGHT: 64 IN | BODY MASS INDEX: 30.73 KG/M2

## 2025-01-23 DIAGNOSIS — E03.9 HYPOTHYROIDISM, UNSPECIFIED: ICD-10-CM

## 2025-01-23 DIAGNOSIS — E78.2 MIXED HYPERLIPIDEMIA: ICD-10-CM

## 2025-01-23 DIAGNOSIS — E11.65 TYPE 2 DIABETES MELLITUS WITH HYPERGLYCEMIA: ICD-10-CM

## 2025-01-23 DIAGNOSIS — R79.89 OTHER SPECIFIED ABNORMAL FINDINGS OF BLOOD CHEMISTRY: ICD-10-CM

## 2025-01-23 DIAGNOSIS — E11.9 TYPE 2 DIABETES MELLITUS W/OUT COMPLICATIONS: ICD-10-CM

## 2025-01-23 LAB — GLUCOSE BLDC GLUCOMTR-MCNC: 207

## 2025-01-23 PROCEDURE — 99214 OFFICE O/P EST MOD 30 MIN: CPT

## 2025-01-23 PROCEDURE — 95251 CONT GLUC MNTR ANALYSIS I&R: CPT

## 2025-01-23 PROCEDURE — 82962 GLUCOSE BLOOD TEST: CPT

## 2025-01-23 PROCEDURE — G2211 COMPLEX E/M VISIT ADD ON: CPT

## 2025-01-23 RX ORDER — HYDROCHLOROTHIAZIDE 25 MG/1
25 TABLET ORAL DAILY
Refills: 0 | Status: ACTIVE | COMMUNITY

## 2025-01-23 RX ORDER — VALSARTAN 160 MG/1
160 TABLET, COATED ORAL TWICE DAILY
Refills: 0 | Status: ACTIVE | COMMUNITY

## 2025-03-06 ENCOUNTER — APPOINTMENT (OUTPATIENT)
Dept: OPHTHALMOLOGY | Facility: CLINIC | Age: 75
End: 2025-03-06
Payer: MEDICARE

## 2025-03-06 ENCOUNTER — NON-APPOINTMENT (OUTPATIENT)
Age: 75
End: 2025-03-06

## 2025-03-06 PROCEDURE — 92004 COMPRE OPH EXAM NEW PT 1/>: CPT

## 2025-03-06 PROCEDURE — 92134 CPTRZ OPH DX IMG PST SGM RTA: CPT

## 2025-04-15 LAB
HBA1C MFR BLD HPLC: 7
LDLC SERPL DIRECT ASSAY-MCNC: 72
MICROALBUMIN/CREAT 24H UR-RTO: NORMAL
TSH SERPL-ACNC: 3.79

## 2025-04-16 ENCOUNTER — APPOINTMENT (OUTPATIENT)
Dept: ENDOCRINOLOGY | Facility: CLINIC | Age: 75
End: 2025-04-16
Payer: MEDICARE

## 2025-04-16 VITALS
WEIGHT: 180 LBS | BODY MASS INDEX: 30.73 KG/M2 | OXYGEN SATURATION: 97 % | HEART RATE: 66 BPM | HEIGHT: 64 IN | DIASTOLIC BLOOD PRESSURE: 80 MMHG | SYSTOLIC BLOOD PRESSURE: 120 MMHG

## 2025-04-16 DIAGNOSIS — E03.9 HYPOTHYROIDISM, UNSPECIFIED: ICD-10-CM

## 2025-04-16 DIAGNOSIS — E78.2 MIXED HYPERLIPIDEMIA: ICD-10-CM

## 2025-04-16 DIAGNOSIS — E11.65 TYPE 2 DIABETES MELLITUS WITH HYPERGLYCEMIA: ICD-10-CM

## 2025-04-16 LAB — GLUCOSE BLDC GLUCOMTR-MCNC: 166

## 2025-04-16 PROCEDURE — 95251 CONT GLUC MNTR ANALYSIS I&R: CPT

## 2025-04-16 PROCEDURE — 99204 OFFICE O/P NEW MOD 45 MIN: CPT

## 2025-04-16 PROCEDURE — G2211 COMPLEX E/M VISIT ADD ON: CPT

## 2025-04-16 PROCEDURE — 82962 GLUCOSE BLOOD TEST: CPT

## 2025-04-28 ENCOUNTER — TRANSCRIPTION ENCOUNTER (OUTPATIENT)
Age: 75
End: 2025-04-28

## 2025-05-01 ENCOUNTER — TRANSCRIPTION ENCOUNTER (OUTPATIENT)
Age: 75
End: 2025-05-01

## 2025-05-06 ENCOUNTER — TRANSCRIPTION ENCOUNTER (OUTPATIENT)
Age: 75
End: 2025-05-06

## 2025-05-12 ENCOUNTER — RX RENEWAL (OUTPATIENT)
Age: 75
End: 2025-05-12

## 2025-05-21 ENCOUNTER — TRANSCRIPTION ENCOUNTER (OUTPATIENT)
Age: 75
End: 2025-05-21

## 2025-06-03 ENCOUNTER — RX RENEWAL (OUTPATIENT)
Age: 75
End: 2025-06-03

## 2025-06-13 ENCOUNTER — RX RENEWAL (OUTPATIENT)
Age: 75
End: 2025-06-13

## 2025-07-16 LAB
HBA1C MFR BLD HPLC: 7.2
LDLC SERPL DIRECT ASSAY-MCNC: 39
TSH SERPL-ACNC: 3.15

## 2025-07-17 ENCOUNTER — APPOINTMENT (OUTPATIENT)
Dept: ENDOCRINOLOGY | Facility: CLINIC | Age: 75
End: 2025-07-17
Payer: MEDICARE

## 2025-07-17 VITALS
WEIGHT: 184 LBS | OXYGEN SATURATION: 98 % | BODY MASS INDEX: 31.41 KG/M2 | SYSTOLIC BLOOD PRESSURE: 112 MMHG | HEIGHT: 64 IN | HEART RATE: 71 BPM | DIASTOLIC BLOOD PRESSURE: 62 MMHG

## 2025-07-17 LAB — GLUCOSE BLDC GLUCOMTR-MCNC: 143

## 2025-07-17 PROCEDURE — 95251 CONT GLUC MNTR ANALYSIS I&R: CPT

## 2025-07-17 PROCEDURE — 82962 GLUCOSE BLOOD TEST: CPT

## 2025-07-17 PROCEDURE — 99214 OFFICE O/P EST MOD 30 MIN: CPT

## 2025-07-17 PROCEDURE — G2211 COMPLEX E/M VISIT ADD ON: CPT

## 2025-07-17 RX ORDER — AMLODIPINE BESYLATE 2.5 MG/1
2.5 TABLET ORAL
Refills: 0 | Status: ACTIVE | COMMUNITY